# Patient Record
Sex: FEMALE | Race: WHITE | NOT HISPANIC OR LATINO | Employment: STUDENT | URBAN - METROPOLITAN AREA
[De-identification: names, ages, dates, MRNs, and addresses within clinical notes are randomized per-mention and may not be internally consistent; named-entity substitution may affect disease eponyms.]

---
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
## 2023-09-22 ENCOUNTER — HOSPITAL ENCOUNTER (EMERGENCY)
Facility: HOSPITAL | Age: 18
Discharge: HOME | End: 2023-09-23
Payer: COMMERCIAL

## 2023-09-22 DIAGNOSIS — S10.93XA CONTUSION OF NECK, INITIAL ENCOUNTER: ICD-10-CM

## 2023-09-22 DIAGNOSIS — S09.90XA CLOSED HEAD INJURY, INITIAL ENCOUNTER: Primary | ICD-10-CM

## 2023-09-22 PROCEDURE — 99284 EMERGENCY DEPT VISIT MOD MDM: CPT

## 2023-09-22 NOTE — Clinical Note
[facility] EMERGENCY DEPARTMENT  [address] [place] [address]  Phone: [phone]    Fanny Madison was seen and treated in our emergency department on 9/22/2023.  She may return to work on 09/25/2023.         Thank you for choosing [facility].    Jaylene Sylvester, APRN

## 2023-09-23 ENCOUNTER — APPOINTMENT (OUTPATIENT)
Dept: GENERAL RADIOLOGY | Facility: HOSPITAL | Age: 18
End: 2023-09-23
Payer: COMMERCIAL

## 2023-09-23 ENCOUNTER — APPOINTMENT (OUTPATIENT)
Dept: CT IMAGING | Facility: HOSPITAL | Age: 18
End: 2023-09-23
Payer: COMMERCIAL

## 2023-09-23 VITALS
HEIGHT: 61 IN | BODY MASS INDEX: 23.73 KG/M2 | SYSTOLIC BLOOD PRESSURE: 100 MMHG | HEART RATE: 98 BPM | TEMPERATURE: 97.5 F | RESPIRATION RATE: 18 BRPM | WEIGHT: 125.66 LBS | DIASTOLIC BLOOD PRESSURE: 60 MMHG | OXYGEN SATURATION: 100 %

## 2023-09-23 PROCEDURE — 70450 CT HEAD/BRAIN W/O DYE: CPT

## 2023-09-23 PROCEDURE — 71045 X-RAY EXAM CHEST 1 VIEW: CPT

## 2023-09-23 PROCEDURE — 72125 CT NECK SPINE W/O DYE: CPT

## 2023-09-23 NOTE — DISCHARGE INSTRUCTIONS
Tylenol Motrin as needed.  Follow-up with your family doctor.  No driving or operating heavy machinery while symptomatic until cleared by PCP or neurology.  May use ice 20 minutes at a time several times a day.  Return for any new or worsening symptoms

## 2023-09-23 NOTE — ED NOTES
C collar placed in triage, pt reports being dropped on head/neck at [employer] while crowd surfing and reports neck pain

## 2023-09-23 NOTE — ED PROVIDER NOTES
"Subjective   History of Present Illness  Chief complaint: Fall      Context: Patient is a 18-year-old female with mother with complaints of \" crowd surfing at the [employer] [place] when she was dropped and landed on the back of her head\" and is complaining of neck pain.  She states she was seen in the medic tent where she states she received IV fluids and was instructed to come to the hospital.  States she did have a brief episode of loss of consciousness without any bowel or bladder incontinence.  Was ambulatory after the incident denies any pain in her mid low back or lower extremities.  She denies any diplopia.  She denies any recreational drug use or alcohol use.  Also complaining of posterior neck and shoulder pain.  No distal weakness or numbness        PCP:         Review of Systems   Constitutional:  Negative for fever.     Past Medical History:   Diagnosis Date    Anxiety     Asthma     Deep vein thrombosis     Eating disorder     Low back pain        Allergies   Allergen Reactions    Aloe Other (See Comments)     Skin irritation and burning    Sunscreens Other (See Comments)     rash       No past surgical history on file.    Family History   Problem Relation Age of Onset    Depression Mother     Depression Father     Alcohol abuse Father     Cancer Maternal Aunt         breast       Social History     Socioeconomic History    Marital status: Single   Tobacco Use    Smoking status: Former    Smokeless tobacco: Never   Vaping Use    Vaping Use: Every day    Substances: Nicotine   Substance and Sexual Activity    Alcohol use: No    Drug use: No           Objective   Physical Exam    Vital signs and traige nurse note reviewed.  Constitutional:  Awake, alert; well developed and well nourished.  No acute distress, the patient is examined in hospital gown.  Patient is on her phone throughout most of the exam, parents at bedside.  HEENT:  Normocephalic, atraumatic; pupils are PERRL with intact EOM; " oropharynx is pink and moist without edema or erythema.  Neck: Supple, arrived in a c-collar  Cardiovascular: Regular rate and rhythm, normal S1-S2. .  Pulmonary: Respiratory effort regular, nonlabored; breath sounds CTA without wheezing or rhonchi.  Abdomen: Soft, nontender, nondistended with normoactive bowel sounds; no rebound or guarding.    Musculoskeletal: Independent range of motion of all extremities, no palpable tenderness or edema.   Back:  Spine is midline and without midline tenderness on palpation of cervical, thoracic, and lumbar spine; paraspinal musculature is nontender and without soft tissue swelling, overlying ecchymosis or erythema. ROM is without pain,  Distal muscle strength is 5/5.  Neuro: Alert oriented x3, speech is clear and appropriate. DTRs are symmetrical bilateral lower extremity, negative Babinski BLE, negative straight leg raise BLE, strong and equal dorsiflexion of bilateral great toes L4, L5, S1 motor sensory intact.      Procedures           ED Course  ED Course as of 09/23/23 0405   Sat Sep 23, 2023   0004 Care assumed   [JW]   0112 Nursing staff notified to ambulate patient and take off c-collar [JW]      ED Course User Index  [JW] Jaylene Sylvester, ELIEZER           Labs Reviewed - No data to display  Medications - No data to display  CT Head Without Contrast    Result Date: 9/23/2023  Impression: No acute intracranial process identified. Electronically Signed: Amisha Cronin MD  9/23/2023 12:58 AM EDT  Workstation ID: [id number]    CT Cervical Spine Without Contrast    Result Date: 9/23/2023  Impression: No acute osseous abnormality. Electronically Signed: Amisha Cronin MD  9/23/2023 1:00 AM EDT  Workstation ID: [id number]    XR Chest 1 View    Result Date: 9/23/2023  Impression: No acute cardiopulmonary process. Limited evaluation of the bilateral shoulders demonstrates no acute process. Electronically Signed: Amisha Cronin MD  9/23/2023 12:45 AM EDT  Workstation ID: [id number]   Prior to  "Admission medications    Medication Sig Start Date End Date Taking? Authorizing Provider   loratadine (CLARITIN) 10 MG tablet Take 1 tablet by mouth Daily. 5/1/19   Marleen Steiner PA   norelgestromin-ethinyl estradiol (Xulane) 150-35 MCG/24HR Place 1 patch on the skin as directed by provider 1 (One) Time Per Week. 9/29/22   Deandra Cruz APRN   spironolactone (ALDACTONE) 25 MG tablet Take 25 mg by mouth Daily. 1/5/22 1/5/23  Provider, MD Lashawn                                     Medical Decision Making      /73   Pulse 101   Temp 97.6 °F (36.4 °C) (Oral)   Resp 18   Ht 154.9 cm (61\")   Wt 57 kg (125 lb 10.6 oz)   SpO2 100%   BMI 23.74 kg/m²         Radiology interpretation:  X-rays reviewed independently and interpreted by me: Negative for fracture.  CT of the head and neck negative for ICH and fracture  Further interpretation by radiologist as above  Lab interpretation: Not felt to be emergently warranted          Appropriate PPE worn during exam.  Discussed physical findings with patient and parents.  CTs and x-rays were obtained to evaluate for fracture ICH also discussed potential concussion.  She was offered Tylenol and refused. After resulted c-collar was removed and patient was ambulated without any ataxia or syncope.            i discussed findings with patient and parents who voices understanding of discharge instructions, signs and symptoms requiring return to ED; discharged improved and in stable condition with follow up for re-evaluation.  This document is intended for medical expert use only. Reading of this document by patients and/or patient's family without participating medical staff guidance may result in misinterpretation and unintended morbidity.  Any interpretation of such data is the responsibility of the patient and/or family member responsible for the patient in concert with their primary or specialist providers, not to be left for sources of online searches such " as [employer], [employer] or similar queries. Relying on these approaches to knowledge may result in misinterpretation, misguided goals of care and even death should patients or family members try recommendations outside of the realm of professional medical care in a supervised inpatient environment.         Problems Addressed:  Closed head injury, initial encounter: complicated acute illness or injury  Contusion of neck, initial encounter: complicated acute illness or injury    Amount and/or Complexity of Data Reviewed  Radiology: ordered.        Final diagnoses:   Closed head injury, initial encounter   Contusion of neck, initial encounter       ED Disposition  ED Disposition       ED Disposition   Discharge    Condition   Stable    Comment   --               Deandra Cruz, APRN  [address]  [place] [address]  [phone]    Schedule an appointment as soon as possible for a visit            Medication List      No changes were made to your prescriptions during this visit.            Jaylene Sylvester, APRN  09/23/23 0406

## 2024-08-29 ENCOUNTER — HOSPITAL ENCOUNTER (EMERGENCY)
Facility: HOSPITAL | Age: 19
Discharge: HOME | End: 2024-08-29
Attending: EMERGENCY MEDICINE
Payer: MEDICAID

## 2024-08-29 ENCOUNTER — APPOINTMENT (OUTPATIENT)
Dept: ULTRASOUND IMAGING | Facility: HOSPITAL | Age: 19
End: 2024-08-29
Payer: MEDICAID

## 2024-08-29 VITALS
HEIGHT: 61 IN | SYSTOLIC BLOOD PRESSURE: 105 MMHG | DIASTOLIC BLOOD PRESSURE: 71 MMHG | OXYGEN SATURATION: 99 % | WEIGHT: 125.66 LBS | BODY MASS INDEX: 23.73 KG/M2 | HEART RATE: 103 BPM | TEMPERATURE: 97.5 F | RESPIRATION RATE: 11 BRPM

## 2024-08-29 DIAGNOSIS — O03.9 SPONTANEOUS ABORTION: Primary | ICD-10-CM

## 2024-08-29 LAB
ABO GROUP BLD: NORMAL
ANION GAP SERPL CALCULATED.3IONS-SCNC: 10.2 MMOL/L (ref 5–15)
BASOPHILS # BLD AUTO: 0.03 10*3/MM3 (ref 0–0.2)
BASOPHILS NFR BLD AUTO: 0.2 % (ref 0–1.5)
BUN SERPL-MCNC: 11 MG/DL (ref 6–20)
BUN/CREAT SERPL: 21.6 (ref 7–25)
CALCIUM SPEC-SCNC: 8.3 MG/DL (ref 8.6–10.5)
CHLORIDE SERPL-SCNC: 104 MMOL/L (ref 98–107)
CO2 SERPL-SCNC: 19.8 MMOL/L (ref 22–29)
CREAT SERPL-MCNC: 0.51 MG/DL (ref 0.57–1)
DEPRECATED RDW RBC AUTO: 39.9 FL (ref 37–54)
EGFRCR SERPLBLD CKD-EPI 2021: 136.4 ML/MIN/1.73
EOSINOPHIL # BLD AUTO: 0.02 10*3/MM3 (ref 0–0.4)
EOSINOPHIL NFR BLD AUTO: 0.1 % (ref 0.3–6.2)
ERYTHROCYTE [DISTWIDTH] IN BLOOD BY AUTOMATED COUNT: 11.7 % (ref 12.3–15.4)
GLUCOSE SERPL-MCNC: 93 MG/DL (ref 65–99)
HCT VFR BLD AUTO: 31.3 % (ref 34–46.6)
HGB BLD-MCNC: 11.1 G/DL (ref 12–15.9)
IMM GRANULOCYTES # BLD AUTO: 0.06 10*3/MM3 (ref 0–0.05)
IMM GRANULOCYTES NFR BLD AUTO: 0.4 % (ref 0–0.5)
LYMPHOCYTES # BLD AUTO: 1.54 10*3/MM3 (ref 0.7–3.1)
LYMPHOCYTES NFR BLD AUTO: 10.3 % (ref 19.6–45.3)
MCH RBC QN AUTO: 33.3 PG (ref 26.6–33)
MCHC RBC AUTO-ENTMCNC: 35.5 G/DL (ref 31.5–35.7)
MCV RBC AUTO: 94 FL (ref 79–97)
MONOCYTES # BLD AUTO: 0.96 10*3/MM3 (ref 0.1–0.9)
MONOCYTES NFR BLD AUTO: 6.4 % (ref 5–12)
NEUTROPHILS NFR BLD AUTO: 12.3 10*3/MM3 (ref 1.7–7)
NEUTROPHILS NFR BLD AUTO: 82.6 % (ref 42.7–76)
NRBC BLD AUTO-RTO: 0 /100 WBC (ref 0–0.2)
NUMBER OF DOSES: NORMAL
PLATELET # BLD AUTO: 251 10*3/MM3 (ref 140–450)
PMV BLD AUTO: 8.5 FL (ref 6–12)
POTASSIUM SERPL-SCNC: 4 MMOL/L (ref 3.5–5.2)
QT INTERVAL: 342 MS
QTC INTERVAL: 408 MS
RBC # BLD AUTO: 3.33 10*6/MM3 (ref 3.77–5.28)
RH BLD: POSITIVE
SODIUM SERPL-SCNC: 134 MMOL/L (ref 136–145)
WBC NRBC COR # BLD AUTO: 14.91 10*3/MM3 (ref 3.4–10.8)

## 2024-08-29 PROCEDURE — 86900 BLOOD TYPING SEROLOGIC ABO: CPT | Performed by: EMERGENCY MEDICINE

## 2024-08-29 PROCEDURE — 96374 THER/PROPH/DIAG INJ IV PUSH: CPT

## 2024-08-29 PROCEDURE — 99284 EMERGENCY DEPT VISIT MOD MDM: CPT

## 2024-08-29 PROCEDURE — 93005 ELECTROCARDIOGRAM TRACING: CPT | Performed by: EMERGENCY MEDICINE

## 2024-08-29 PROCEDURE — 93005 ELECTROCARDIOGRAM TRACING: CPT

## 2024-08-29 PROCEDURE — 25010000002 HYDROMORPHONE 1 MG/ML SOLUTION: Performed by: EMERGENCY MEDICINE

## 2024-08-29 PROCEDURE — 76801 OB US < 14 WKS SINGLE FETUS: CPT

## 2024-08-29 PROCEDURE — 25810000003 LACTATED RINGERS SOLUTION: Performed by: EMERGENCY MEDICINE

## 2024-08-29 PROCEDURE — 93976 VASCULAR STUDY: CPT

## 2024-08-29 PROCEDURE — 86901 BLOOD TYPING SEROLOGIC RH(D): CPT | Performed by: EMERGENCY MEDICINE

## 2024-08-29 PROCEDURE — 76817 TRANSVAGINAL US OBSTETRIC: CPT

## 2024-08-29 PROCEDURE — 85025 COMPLETE CBC W/AUTO DIFF WBC: CPT | Performed by: EMERGENCY MEDICINE

## 2024-08-29 PROCEDURE — 80048 BASIC METABOLIC PNL TOTAL CA: CPT | Performed by: EMERGENCY MEDICINE

## 2024-08-29 RX ORDER — METHYLERGONOVINE MALEATE 0.2 MG/1
0.2 TABLET ORAL EVERY 6 HOURS
Qty: 7 TABLET | Refills: 0 | Status: SHIPPED | OUTPATIENT
Start: 2024-08-29 | End: 2024-09-01

## 2024-08-29 RX ORDER — METHYLERGONOVINE MALEATE 0.2 MG/1
200 TABLET ORAL ONCE
Status: COMPLETED | OUTPATIENT
Start: 2024-08-29 | End: 2024-08-29

## 2024-08-29 RX ORDER — HYDROCODONE BITARTRATE AND ACETAMINOPHEN 5; 325 MG/1; MG/1
1 TABLET ORAL EVERY 6 HOURS PRN
Qty: 6 TABLET | Refills: 0 | Status: SHIPPED | OUTPATIENT
Start: 2024-08-29 | End: 2024-09-01

## 2024-08-29 RX ADMIN — METHYLERGONOVINE MALEATE 200 MCG: 0.2 TABLET ORAL at 03:29

## 2024-08-29 RX ADMIN — SODIUM CHLORIDE, POTASSIUM CHLORIDE, SODIUM LACTATE AND CALCIUM CHLORIDE 1000 ML: 600; 310; 30; 20 INJECTION, SOLUTION INTRAVENOUS at 01:02

## 2024-08-29 RX ADMIN — HYDROMORPHONE HYDROCHLORIDE 0.25 MG: 1 INJECTION, SOLUTION INTRAMUSCULAR; INTRAVENOUS; SUBCUTANEOUS at 03:14

## 2024-08-29 NOTE — DISCHARGE INSTRUCTIONS
Rest today.  Follow-up Dr. Thompson call office today with report on how you are doing, return increased bleeding or as needed

## 2024-08-29 NOTE — ED PROVIDER NOTES
Subjective   History of Present Illness  19-year-old female presents after having received misoprostol for fetal demise earlier today.  She had been followed by GYN.  She had IUP that showed no growth and no cardiac activity.  She had not spontaneously miscarried.  She is G2, P0 Ab1.  She states that she started having heavy bleeding tonight.  She reports she had passed out.  She has had no chest pain shortness of breath.  She has not complained of any significant abdominal pain at this time.  She states her bleeding was so heavy she was wearing adult diaper.  Review of Systems    Past Medical History:   Diagnosis Date    Anxiety     Asthma     Deep vein thrombosis     Eating disorder     Low back pain        Allergies   Allergen Reactions    Aloe Other (See Comments)     Skin irritation and burning    Sunscreens Other (See Comments)     rash       No past surgical history on file.    Family History   Problem Relation Age of Onset    Depression Mother     Depression Father     Alcohol abuse Father     Cancer Maternal Aunt         breast       Social History     Socioeconomic History    Marital status: Single   Tobacco Use    Smoking status: Former    Smokeless tobacco: Never   Vaping Use    Vaping status: Every Day    Substances: Nicotine   Substance and Sexual Activity    Alcohol use: No    Drug use: No     No routine medications      Objective   Physical Exam  19-year-old female awake alert.  Generally well-developed well-nourished.  Chest clear cardiovascular rhythm abdomen is soft without mass rebound or guarding pelvic exam was performed she has noted to have clot in vault was flooded initially.  This was removed.  There was clot at cervical os.  She did not have significant tenderness on bimanual exam.  Procedures           ED Course      Results for orders placed or performed during the hospital encounter of 08/29/24   Basic Metabolic Panel    Specimen: Arm, Left; Blood   Result Value Ref Range    Glucose 93  65 - 99 mg/dL    BUN 11 6 - 20 mg/dL    Creatinine 0.51 (L) 0.57 - 1.00 mg/dL    Sodium 134 (L) 136 - 145 mmol/L    Potassium 4.0 3.5 - 5.2 mmol/L    Chloride 104 98 - 107 mmol/L    CO2 19.8 (L) 22.0 - 29.0 mmol/L    Calcium 8.3 (L) 8.6 - 10.5 mg/dL    BUN/Creatinine Ratio 21.6 7.0 - 25.0    Anion Gap 10.2 5.0 - 15.0 mmol/L    eGFR 136.4 >60.0 mL/min/1.73   CBC Auto Differential    Specimen: Arm, Left; Blood   Result Value Ref Range    WBC 14.91 (H) 3.40 - 10.80 10*3/mm3    RBC 3.33 (L) 3.77 - 5.28 10*6/mm3    Hemoglobin 11.1 (L) 12.0 - 15.9 g/dL    Hematocrit 31.3 (L) 34.0 - 46.6 %    MCV 94.0 79.0 - 97.0 fL    MCH 33.3 (H) 26.6 - 33.0 pg    MCHC 35.5 31.5 - 35.7 g/dL    RDW 11.7 (L) 12.3 - 15.4 %    RDW-SD 39.9 37.0 - 54.0 fl    MPV 8.5 6.0 - 12.0 fL    Platelets 251 140 - 450 10*3/mm3    Neutrophil % 82.6 (H) 42.7 - 76.0 %    Lymphocyte % 10.3 (L) 19.6 - 45.3 %    Monocyte % 6.4 5.0 - 12.0 %    Eosinophil % 0.1 (L) 0.3 - 6.2 %    Basophil % 0.2 0.0 - 1.5 %    Immature Grans % 0.4 0.0 - 0.5 %    Neutrophils, Absolute 12.30 (H) 1.70 - 7.00 10*3/mm3    Lymphocytes, Absolute 1.54 0.70 - 3.10 10*3/mm3    Monocytes, Absolute 0.96 (H) 0.10 - 0.90 10*3/mm3    Eosinophils, Absolute 0.02 0.00 - 0.40 10*3/mm3    Basophils, Absolute 0.03 0.00 - 0.20 10*3/mm3    Immature Grans, Absolute 0.06 (H) 0.00 - 0.05 10*3/mm3    nRBC 0.0 0.0 - 0.2 /100 WBC   ECG 12 Lead Syncope   Result Value Ref Range    QT Interval 342 ms    QTC Interval 408 ms    RhIg Evaluation    Specimen: Arm, Left; Blood   Result Value Ref Range    ABO Type A     RH type Positive    Doses of Rh Immune Globulin    Specimen: Arm, Left; Blood   Result Value Ref Range    Number of Doses       RhIg is not indicated due to the patient's Rh status     US Ob Transvaginal    Result Date: 2024  Impression: Blood products and debris present within the endometrial cavity. No gestational sac. No suspicious adnexal mass. Electronically Signed: Amisha Cronin MD   "2024 2:44 AM EDT  Workstation ID: [id number]    US Ob < 14 Weeks Single or First Gestation    Result Date: 2024  Impression: Blood products and debris present within the endometrial cavity. No gestational sac. No suspicious adnexal mass. Electronically Signed: Amisha Cronin MD  2024 2:44 AM EDT  Workstation ID: [id number]   Medications   lactated ringers bolus 1,000 mL (0 mL Intravenous Stopped 24 0156)   HYDROmorphone (DILAUDID) injection 0.25 mg (0.25 mg Intravenous Given 24 0314)   methylergonovine (METHERGINE) tablet 200 mcg (200 mcg Oral Given 24)     /71 (BP Location: Right arm, Patient Position: Lying)   Pulse 103   Temp 97.5 °F (36.4 °C)   Resp 11   Ht 154.9 cm (61\")   Wt 57 kg (125 lb 10.6 oz)   SpO2 99%   BMI 23.74 kg/m²                                          Medical Decision Making  Problems Addressed:  Spontaneous : complicated acute illness or injury    Amount and/or Complexity of Data Reviewed  Labs: ordered.  Radiology: ordered.  ECG/medicine tests: ordered.    Risk  Prescription drug management.    Chart review: Patient had GYN appointment 2022 with breakthrough bleeding with IUD and malpositioned IUD.  Comorbidity: As per past history   Differential: Impending versus incomplete AB, acute blood loss anemia  My EKG interpretation: Sinus rhythm rate of 85  Lab: Rh+ basic metabolic panel sodium 135 BUN 11 creatinine 0.51 CO2 19 CBC white count 14.9 with hemoglobin 11.1 platelet count of 251 with 82 segs no bands  My Radiology review and interpretation: Ultrasound was obtained uterus measures 10.8 cm there is some complex debris and blood products within the endometrial cavity.  Ovaries appear normal with normal vascular flow.  Discussion/treatment: Repeat evaluation patient's bleeding has improved.  She was given a liter of fluids.  Patient was discussed with Dr. Spencer recommended Methergine.  She was given first dose of this.  She was also " given Dilaudid as she was having some crampy pain.  She was discharged with prescription for Methergine.  Hydrocodone.  She is to rest today follow-up with obstetrician call office today with report on how she is doing.  Patient was evaluated using appropriate PPE      Final diagnoses:   Spontaneous        ED Disposition  ED Disposition       ED Disposition   Discharge    Condition   Stable    Comment   --               Sena Spencer MD  [facility] [address] [place] [address] [phone]               Medication List        New Prescriptions      HYDROcodone-acetaminophen 5-325 MG per tablet  Commonly known as: Norco  Take 1 tablet by mouth Every 6 (Six) Hours As Needed for Moderate Pain.     methylergonovine 0.2 MG tablet  Commonly known as: Methergine  Take 1 tablet by mouth Every 6 (Six) Hours.               Where to Get Your Medications        These medications were sent to [facility] [id number] - [place], [place] - [address]  AT [facility] - [phone]  - [phone] FX  [address] [place] [address]      Phone: [phone]   HYDROcodone-acetaminophen 5-325 MG per tablet  methylergonovine 0.2 MG tablet            Bobo Sharpe MD  24 1234

## 2024-09-01 ENCOUNTER — APPOINTMENT (OUTPATIENT)
Dept: ULTRASOUND IMAGING | Facility: HOSPITAL | Age: 19
End: 2024-09-01
Payer: MEDICAID

## 2024-09-01 ENCOUNTER — ANESTHESIA (OUTPATIENT)
Dept: PERIOP | Facility: HOSPITAL | Age: 19
End: 2024-09-01
Payer: MEDICAID

## 2024-09-01 ENCOUNTER — ANESTHESIA EVENT (OUTPATIENT)
Dept: PERIOP | Facility: HOSPITAL | Age: 19
End: 2024-09-01
Payer: MEDICAID

## 2024-09-01 ENCOUNTER — HOSPITAL ENCOUNTER (OUTPATIENT)
Facility: HOSPITAL | Age: 19
Discharge: HOME | End: 2024-09-02
Attending: STUDENT IN AN ORGANIZED HEALTH CARE EDUCATION/TRAINING PROGRAM | Admitting: STUDENT IN AN ORGANIZED HEALTH CARE EDUCATION/TRAINING PROGRAM
Payer: MEDICAID

## 2024-09-01 DIAGNOSIS — N93.9 VAGINAL BLEEDING: Primary | ICD-10-CM

## 2024-09-01 DIAGNOSIS — O03.4 INCOMPLETE ABORTION: ICD-10-CM

## 2024-09-01 DIAGNOSIS — O03.9 SPONTANEOUS ABORTION: ICD-10-CM

## 2024-09-01 PROBLEM — D62 ANEMIA DUE TO ACUTE BLOOD LOSS: Status: ACTIVE | Noted: 2024-09-01

## 2024-09-01 LAB
ABO GROUP BLD: NORMAL
ALBUMIN SERPL-MCNC: 4.3 G/DL (ref 3.5–5.2)
ALBUMIN/GLOB SERPL: 1.7 G/DL
ALP SERPL-CCNC: 40 U/L (ref 39–117)
ALT SERPL W P-5'-P-CCNC: 6 U/L (ref 1–33)
ANION GAP SERPL CALCULATED.3IONS-SCNC: 11.3 MMOL/L (ref 5–15)
AST SERPL-CCNC: 21 U/L (ref 1–32)
BASOPHILS # BLD AUTO: 0.02 10*3/MM3 (ref 0–0.2)
BASOPHILS # BLD AUTO: 0.04 10*3/MM3 (ref 0–0.2)
BASOPHILS NFR BLD AUTO: 0.2 % (ref 0–1.5)
BASOPHILS NFR BLD AUTO: 0.3 % (ref 0–1.5)
BILIRUB SERPL-MCNC: 0.2 MG/DL (ref 0–1.2)
BLD GP AB SCN SERPL QL: NEGATIVE
BUN SERPL-MCNC: 10 MG/DL (ref 6–20)
BUN/CREAT SERPL: 17.2 (ref 7–25)
CALCIUM SPEC-SCNC: 9.5 MG/DL (ref 8.6–10.5)
CHLORIDE SERPL-SCNC: 106 MMOL/L (ref 98–107)
CO2 SERPL-SCNC: 23.7 MMOL/L (ref 22–29)
CREAT SERPL-MCNC: 0.58 MG/DL (ref 0.57–1)
DEPRECATED RDW RBC AUTO: 40.5 FL (ref 37–54)
DEPRECATED RDW RBC AUTO: 41.3 FL (ref 37–54)
EGFRCR SERPLBLD CKD-EPI 2021: 132.2 ML/MIN/1.73
EOSINOPHIL # BLD AUTO: 0.05 10*3/MM3 (ref 0–0.4)
EOSINOPHIL # BLD AUTO: 0.07 10*3/MM3 (ref 0–0.4)
EOSINOPHIL NFR BLD AUTO: 0.5 % (ref 0.3–6.2)
EOSINOPHIL NFR BLD AUTO: 0.6 % (ref 0.3–6.2)
ERYTHROCYTE [DISTWIDTH] IN BLOOD BY AUTOMATED COUNT: 11.7 % (ref 12.3–15.4)
ERYTHROCYTE [DISTWIDTH] IN BLOOD BY AUTOMATED COUNT: 11.9 % (ref 12.3–15.4)
GLOBULIN UR ELPH-MCNC: 2.5 GM/DL
GLUCOSE SERPL-MCNC: 111 MG/DL (ref 65–99)
HCG INTACT+B SERPL-ACNC: 1032 MIU/ML
HCT VFR BLD AUTO: 21.6 % (ref 34–46.6)
HCT VFR BLD AUTO: 25.9 % (ref 34–46.6)
HCT VFR BLD AUTO: 30.3 % (ref 34–46.6)
HGB BLD-MCNC: 10.4 G/DL (ref 12–15.9)
HGB BLD-MCNC: 7.7 G/DL (ref 12–15.9)
HGB BLD-MCNC: 8.8 G/DL (ref 12–15.9)
IMM GRANULOCYTES # BLD AUTO: 0.02 10*3/MM3 (ref 0–0.05)
IMM GRANULOCYTES # BLD AUTO: 0.07 10*3/MM3 (ref 0–0.05)
IMM GRANULOCYTES NFR BLD AUTO: 0.2 % (ref 0–0.5)
IMM GRANULOCYTES NFR BLD AUTO: 0.5 % (ref 0–0.5)
LYMPHOCYTES # BLD AUTO: 1.99 10*3/MM3 (ref 0.7–3.1)
LYMPHOCYTES # BLD AUTO: 2.6 10*3/MM3 (ref 0.7–3.1)
LYMPHOCYTES NFR BLD AUTO: 18 % (ref 19.6–45.3)
LYMPHOCYTES NFR BLD AUTO: 23.1 % (ref 19.6–45.3)
MCH RBC QN AUTO: 32.8 PG (ref 26.6–33)
MCH RBC QN AUTO: 34.4 PG (ref 26.6–33)
MCHC RBC AUTO-ENTMCNC: 34.3 G/DL (ref 31.5–35.7)
MCHC RBC AUTO-ENTMCNC: 35.6 G/DL (ref 31.5–35.7)
MCV RBC AUTO: 95.6 FL (ref 79–97)
MCV RBC AUTO: 96.4 FL (ref 79–97)
MONOCYTES # BLD AUTO: 0.61 10*3/MM3 (ref 0.1–0.9)
MONOCYTES # BLD AUTO: 0.87 10*3/MM3 (ref 0.1–0.9)
MONOCYTES NFR BLD AUTO: 6 % (ref 5–12)
MONOCYTES NFR BLD AUTO: 7.1 % (ref 5–12)
NEUTROPHILS NFR BLD AUTO: 10.79 10*3/MM3 (ref 1.7–7)
NEUTROPHILS NFR BLD AUTO: 5.93 10*3/MM3 (ref 1.7–7)
NEUTROPHILS NFR BLD AUTO: 68.8 % (ref 42.7–76)
NEUTROPHILS NFR BLD AUTO: 74.7 % (ref 42.7–76)
NRBC BLD AUTO-RTO: 0 /100 WBC (ref 0–0.2)
NRBC BLD AUTO-RTO: 0 /100 WBC (ref 0–0.2)
PLATELET # BLD AUTO: 169 10*3/MM3 (ref 140–450)
PLATELET # BLD AUTO: 279 10*3/MM3 (ref 140–450)
PMV BLD AUTO: 8.7 FL (ref 6–12)
PMV BLD AUTO: 8.7 FL (ref 6–12)
POTASSIUM SERPL-SCNC: 3.7 MMOL/L (ref 3.5–5.2)
PROT SERPL-MCNC: 6.8 G/DL (ref 6–8.5)
RBC # BLD AUTO: 2.24 10*6/MM3 (ref 3.77–5.28)
RBC # BLD AUTO: 3.17 10*6/MM3 (ref 3.77–5.28)
RH BLD: POSITIVE
SODIUM SERPL-SCNC: 141 MMOL/L (ref 136–145)
T&S EXPIRATION DATE: NORMAL
WBC NRBC COR # BLD AUTO: 14.44 10*3/MM3 (ref 3.4–10.8)
WBC NRBC COR # BLD AUTO: 8.62 10*3/MM3 (ref 3.4–10.8)
WHOLE BLOOD HOLD COAG: NORMAL

## 2024-09-01 PROCEDURE — 25010000002 MIDAZOLAM PER 1 MG

## 2024-09-01 PROCEDURE — 25810000003 LACTATED RINGERS PER 1000 ML

## 2024-09-01 PROCEDURE — 76817 TRANSVAGINAL US OBSTETRIC: CPT

## 2024-09-01 PROCEDURE — 25010000002 HYDROMORPHONE 1 MG/ML SOLUTION

## 2024-09-01 PROCEDURE — 86901 BLOOD TYPING SEROLOGIC RH(D): CPT | Performed by: NURSE PRACTITIONER

## 2024-09-01 PROCEDURE — 25810000003 LACTATED RINGERS PER 1000 ML: Performed by: STUDENT IN AN ORGANIZED HEALTH CARE EDUCATION/TRAINING PROGRAM

## 2024-09-01 PROCEDURE — 99285 EMERGENCY DEPT VISIT HI MDM: CPT

## 2024-09-01 PROCEDURE — 80053 COMPREHEN METABOLIC PANEL: CPT | Performed by: NURSE PRACTITIONER

## 2024-09-01 PROCEDURE — 25010000002 FENTANYL CITRATE (PF) 100 MCG/2ML SOLUTION

## 2024-09-01 PROCEDURE — 84702 CHORIONIC GONADOTROPIN TEST: CPT | Performed by: NURSE PRACTITIONER

## 2024-09-01 PROCEDURE — 86850 RBC ANTIBODY SCREEN: CPT | Performed by: NURSE PRACTITIONER

## 2024-09-01 PROCEDURE — 82330 ASSAY OF CALCIUM: CPT

## 2024-09-01 PROCEDURE — 82803 BLOOD GASES ANY COMBINATION: CPT

## 2024-09-01 PROCEDURE — 88305 TISSUE EXAM BY PATHOLOGIST: CPT | Performed by: STUDENT IN AN ORGANIZED HEALTH CARE EDUCATION/TRAINING PROGRAM

## 2024-09-01 PROCEDURE — 25010000002 SUCCINYLCHOLINE PER 20 MG

## 2024-09-01 PROCEDURE — 25010000002 SUGAMMADEX 200 MG/2ML SOLUTION

## 2024-09-01 PROCEDURE — 85025 COMPLETE CBC W/AUTO DIFF WBC: CPT | Performed by: NURSE PRACTITIONER

## 2024-09-01 PROCEDURE — 25010000002 KETOROLAC TROMETHAMINE PER 15 MG: Performed by: STUDENT IN AN ORGANIZED HEALTH CARE EDUCATION/TRAINING PROGRAM

## 2024-09-01 PROCEDURE — 25010000002 ALBUMIN HUMAN 5% PER 50 ML

## 2024-09-01 PROCEDURE — 84295 ASSAY OF SERUM SODIUM: CPT

## 2024-09-01 PROCEDURE — 25010000002 DEXAMETHASONE PER 1 MG

## 2024-09-01 PROCEDURE — 93976 VASCULAR STUDY: CPT

## 2024-09-01 PROCEDURE — G0378 HOSPITAL OBSERVATION PER HR: HCPCS

## 2024-09-01 PROCEDURE — 85018 HEMOGLOBIN: CPT

## 2024-09-01 PROCEDURE — 25010000002 PROPOFOL 500 MG/50ML EMULSION

## 2024-09-01 PROCEDURE — 82947 ASSAY GLUCOSE BLOOD QUANT: CPT

## 2024-09-01 PROCEDURE — 85025 COMPLETE CBC W/AUTO DIFF WBC: CPT | Performed by: STUDENT IN AN ORGANIZED HEALTH CARE EDUCATION/TRAINING PROGRAM

## 2024-09-01 PROCEDURE — 76801 OB US < 14 WKS SINGLE FETUS: CPT

## 2024-09-01 PROCEDURE — 25810000003 SODIUM CHLORIDE 0.9 % SOLUTION: Performed by: NURSE PRACTITIONER

## 2024-09-01 PROCEDURE — 25010000002 PHENYLEPHRINE 10 MG/ML SOLUTION

## 2024-09-01 PROCEDURE — 86900 BLOOD TYPING SEROLOGIC ABO: CPT | Performed by: NURSE PRACTITIONER

## 2024-09-01 PROCEDURE — 85014 HEMATOCRIT: CPT

## 2024-09-01 PROCEDURE — 25010000002 METHYLERGONOVINE MALEATE PER 0.2 MG

## 2024-09-01 PROCEDURE — 25010000002 ONDANSETRON PER 1 MG

## 2024-09-01 PROCEDURE — P9041 ALBUMIN (HUMAN),5%, 50ML: HCPCS

## 2024-09-01 PROCEDURE — 84132 ASSAY OF SERUM POTASSIUM: CPT

## 2024-09-01 RX ORDER — HYDROCODONE BITARTRATE AND ACETAMINOPHEN 5; 325 MG/1; MG/1
1 TABLET ORAL EVERY 6 HOURS PRN
Status: DISCONTINUED | OUTPATIENT
Start: 2024-09-01 | End: 2024-09-02 | Stop reason: HOSPADM

## 2024-09-01 RX ORDER — DEXAMETHASONE SODIUM PHOSPHATE 4 MG/ML
INJECTION, SOLUTION INTRA-ARTICULAR; INTRALESIONAL; INTRAMUSCULAR; INTRAVENOUS; SOFT TISSUE AS NEEDED
Status: DISCONTINUED | OUTPATIENT
Start: 2024-09-01 | End: 2024-09-01 | Stop reason: SURG

## 2024-09-01 RX ORDER — DIPHENHYDRAMINE HYDROCHLORIDE 50 MG/ML
12.5 INJECTION INTRAMUSCULAR; INTRAVENOUS
Status: DISCONTINUED | OUTPATIENT
Start: 2024-09-01 | End: 2024-09-01

## 2024-09-01 RX ORDER — CETIRIZINE HYDROCHLORIDE 10 MG/1
10 TABLET ORAL DAILY
Status: DISCONTINUED | OUTPATIENT
Start: 2024-09-01 | End: 2024-09-02 | Stop reason: HOSPADM

## 2024-09-01 RX ORDER — METHYLERGONOVINE MALEATE 0.2 MG/1
200 TABLET ORAL ONCE
Status: DISCONTINUED | OUTPATIENT
Start: 2024-09-01 | End: 2024-09-01

## 2024-09-01 RX ORDER — METHYLERGONOVINE MALEATE 0.2 MG/ML
INJECTION INTRAVENOUS AS NEEDED
Status: DISCONTINUED | OUTPATIENT
Start: 2024-09-01 | End: 2024-09-01 | Stop reason: SURG

## 2024-09-01 RX ORDER — ONDANSETRON 4 MG/1
4 TABLET, ORALLY DISINTEGRATING ORAL EVERY 6 HOURS PRN
Status: DISCONTINUED | OUTPATIENT
Start: 2024-09-01 | End: 2024-09-02 | Stop reason: HOSPADM

## 2024-09-01 RX ORDER — OXYCODONE HYDROCHLORIDE 5 MG/1
10 TABLET ORAL EVERY 4 HOURS PRN
Status: DISCONTINUED | OUTPATIENT
Start: 2024-09-01 | End: 2024-09-01

## 2024-09-01 RX ORDER — MIDAZOLAM HYDROCHLORIDE 1 MG/ML
INJECTION INTRAMUSCULAR; INTRAVENOUS AS NEEDED
Status: DISCONTINUED | OUTPATIENT
Start: 2024-09-01 | End: 2024-09-01 | Stop reason: SURG

## 2024-09-01 RX ORDER — DIPHENHYDRAMINE HYDROCHLORIDE 50 MG/ML
12.5 INJECTION INTRAMUSCULAR; INTRAVENOUS ONCE AS NEEDED
Status: DISCONTINUED | OUTPATIENT
Start: 2024-09-01 | End: 2024-09-01

## 2024-09-01 RX ORDER — OXYCODONE HYDROCHLORIDE 5 MG/1
5 TABLET ORAL ONCE AS NEEDED
Status: DISCONTINUED | OUTPATIENT
Start: 2024-09-01 | End: 2024-09-01

## 2024-09-01 RX ORDER — TRANEXAMIC ACID 100 MG/ML
INJECTION, SOLUTION INTRAVENOUS AS NEEDED
Status: DISCONTINUED | OUTPATIENT
Start: 2024-09-01 | End: 2024-09-01 | Stop reason: SURG

## 2024-09-01 RX ORDER — EPHEDRINE SULFATE 5 MG/ML
5 INJECTION INTRAVENOUS ONCE AS NEEDED
Status: DISCONTINUED | OUTPATIENT
Start: 2024-09-01 | End: 2024-09-01

## 2024-09-01 RX ORDER — PROPOFOL 10 MG/ML
INJECTION, EMULSION INTRAVENOUS AS NEEDED
Status: DISCONTINUED | OUTPATIENT
Start: 2024-09-01 | End: 2024-09-01 | Stop reason: SURG

## 2024-09-01 RX ORDER — ONDANSETRON 2 MG/ML
4 INJECTION INTRAMUSCULAR; INTRAVENOUS ONCE AS NEEDED
Status: DISCONTINUED | OUTPATIENT
Start: 2024-09-01 | End: 2024-09-01

## 2024-09-01 RX ORDER — IBUPROFEN 400 MG/1
600 TABLET, FILM COATED ORAL EVERY 6 HOURS PRN
Status: DISCONTINUED | OUTPATIENT
Start: 2024-09-02 | End: 2024-09-02 | Stop reason: HOSPADM

## 2024-09-01 RX ORDER — SODIUM CHLORIDE, SODIUM LACTATE, POTASSIUM CHLORIDE, CALCIUM CHLORIDE 600; 310; 30; 20 MG/100ML; MG/100ML; MG/100ML; MG/100ML
125 INJECTION, SOLUTION INTRAVENOUS CONTINUOUS
Status: DISCONTINUED | OUTPATIENT
Start: 2024-09-01 | End: 2024-09-02 | Stop reason: HOSPADM

## 2024-09-01 RX ORDER — NALOXONE HCL 0.4 MG/ML
0.4 VIAL (ML) INJECTION AS NEEDED
Status: DISCONTINUED | OUTPATIENT
Start: 2024-09-01 | End: 2024-09-01

## 2024-09-01 RX ORDER — ACETAMINOPHEN 325 MG/1
650 TABLET ORAL EVERY 4 HOURS PRN
Status: DISCONTINUED | OUTPATIENT
Start: 2024-09-01 | End: 2024-09-02 | Stop reason: HOSPADM

## 2024-09-01 RX ORDER — METHYLERGONOVINE MALEATE 0.2 MG/1
200 TABLET ORAL EVERY 6 HOURS
Status: DISCONTINUED | OUTPATIENT
Start: 2024-09-01 | End: 2024-09-01

## 2024-09-01 RX ORDER — PHENYLEPHRINE HYDROCHLORIDE 10 MG/ML
INJECTION INTRAVENOUS AS NEEDED
Status: DISCONTINUED | OUTPATIENT
Start: 2024-09-01 | End: 2024-09-01 | Stop reason: SURG

## 2024-09-01 RX ORDER — DEXMEDETOMIDINE HYDROCHLORIDE 100 UG/ML
INJECTION, SOLUTION INTRAVENOUS AS NEEDED
Status: DISCONTINUED | OUTPATIENT
Start: 2024-09-01 | End: 2024-09-01 | Stop reason: SURG

## 2024-09-01 RX ORDER — SODIUM CHLORIDE 0.9 % (FLUSH) 0.9 %
10 SYRINGE (ML) INJECTION AS NEEDED
Status: DISCONTINUED | OUTPATIENT
Start: 2024-09-01 | End: 2024-09-01

## 2024-09-01 RX ORDER — SODIUM CHLORIDE, SODIUM LACTATE, POTASSIUM CHLORIDE, CALCIUM CHLORIDE 600; 310; 30; 20 MG/100ML; MG/100ML; MG/100ML; MG/100ML
INJECTION, SOLUTION INTRAVENOUS CONTINUOUS PRN
Status: DISCONTINUED | OUTPATIENT
Start: 2024-09-01 | End: 2024-09-01 | Stop reason: SURG

## 2024-09-01 RX ORDER — KETOROLAC TROMETHAMINE 30 MG/ML
30 INJECTION, SOLUTION INTRAMUSCULAR; INTRAVENOUS EVERY 6 HOURS PRN
Status: DISPENSED | OUTPATIENT
Start: 2024-09-01 | End: 2024-09-02

## 2024-09-01 RX ORDER — ALBUMIN, HUMAN INJ 5% 5 %
SOLUTION INTRAVENOUS CONTINUOUS PRN
Status: DISCONTINUED | OUTPATIENT
Start: 2024-09-01 | End: 2024-09-01 | Stop reason: SURG

## 2024-09-01 RX ORDER — DEXMEDETOMIDINE HYDROCHLORIDE 100 UG/ML
INJECTION, SOLUTION INTRAVENOUS AS NEEDED
Status: DISCONTINUED | OUTPATIENT
Start: 2024-09-01 | End: 2024-09-01

## 2024-09-01 RX ORDER — SUCCINYLCHOLINE CHLORIDE 20 MG/ML
INJECTION INTRAMUSCULAR; INTRAVENOUS AS NEEDED
Status: DISCONTINUED | OUTPATIENT
Start: 2024-09-01 | End: 2024-09-01 | Stop reason: SURG

## 2024-09-01 RX ORDER — METHYLERGONOVINE MALEATE 0.2 MG/1
200 TABLET ORAL EVERY 6 HOURS SCHEDULED
Status: DISCONTINUED | OUTPATIENT
Start: 2024-09-01 | End: 2024-09-02 | Stop reason: HOSPADM

## 2024-09-01 RX ORDER — ONDANSETRON 2 MG/ML
INJECTION INTRAMUSCULAR; INTRAVENOUS AS NEEDED
Status: DISCONTINUED | OUTPATIENT
Start: 2024-09-01 | End: 2024-09-01 | Stop reason: SURG

## 2024-09-01 RX ORDER — LABETALOL HYDROCHLORIDE 5 MG/ML
5 INJECTION, SOLUTION INTRAVENOUS
Status: DISCONTINUED | OUTPATIENT
Start: 2024-09-01 | End: 2024-09-01

## 2024-09-01 RX ORDER — FENTANYL CITRATE 50 UG/ML
INJECTION, SOLUTION INTRAMUSCULAR; INTRAVENOUS AS NEEDED
Status: DISCONTINUED | OUTPATIENT
Start: 2024-09-01 | End: 2024-09-01 | Stop reason: SURG

## 2024-09-01 RX ORDER — CALCIUM CARBONATE 500 MG/1
2 TABLET, CHEWABLE ORAL 2 TIMES DAILY PRN
Status: DISCONTINUED | OUTPATIENT
Start: 2024-09-01 | End: 2024-09-02 | Stop reason: HOSPADM

## 2024-09-01 RX ORDER — ROCURONIUM BROMIDE 10 MG/ML
INJECTION, SOLUTION INTRAVENOUS AS NEEDED
Status: DISCONTINUED | OUTPATIENT
Start: 2024-09-01 | End: 2024-09-01 | Stop reason: SURG

## 2024-09-01 RX ORDER — SCOLOPAMINE TRANSDERMAL SYSTEM 1 MG/1
PATCH, EXTENDED RELEASE TRANSDERMAL AS NEEDED
Status: DISCONTINUED | OUTPATIENT
Start: 2024-09-01 | End: 2024-09-01 | Stop reason: SURG

## 2024-09-01 RX ORDER — MISOPROSTOL 200 UG/1
800 TABLET ORAL ONCE
Status: DISCONTINUED | OUTPATIENT
Start: 2024-09-01 | End: 2024-09-01

## 2024-09-01 RX ORDER — MISOPROSTOL 200 UG/1
800 TABLET ORAL ONCE
Status: DISCONTINUED | OUTPATIENT
Start: 2024-09-01 | End: 2024-09-02 | Stop reason: HOSPADM

## 2024-09-01 RX ORDER — HYDRALAZINE HYDROCHLORIDE 20 MG/ML
5 INJECTION INTRAMUSCULAR; INTRAVENOUS
Status: DISCONTINUED | OUTPATIENT
Start: 2024-09-01 | End: 2024-09-01

## 2024-09-01 RX ORDER — DOXYCYCLINE 100 MG/1
200 CAPSULE ORAL ONCE
Status: COMPLETED | OUTPATIENT
Start: 2024-09-01 | End: 2024-09-01

## 2024-09-01 RX ADMIN — ALBUMIN (HUMAN): 12.5 INJECTION, SOLUTION INTRAVENOUS at 14:35

## 2024-09-01 RX ADMIN — SODIUM CHLORIDE 1000 ML: 9 INJECTION, SOLUTION INTRAVENOUS at 02:19

## 2024-09-01 RX ADMIN — MIDAZOLAM 2 MG: 1 INJECTION INTRAMUSCULAR; INTRAVENOUS at 13:23

## 2024-09-01 RX ADMIN — ONDANSETRON 4 MG: 2 INJECTION INTRAMUSCULAR; INTRAVENOUS at 13:55

## 2024-09-01 RX ADMIN — METHYLERGONOVINE MALEATE 200 MCG: 0.2 TABLET ORAL at 12:21

## 2024-09-01 RX ADMIN — PROPOFOL INJECTABLE EMULSION 200 MG: 10 INJECTION, EMULSION INTRAVENOUS at 13:30

## 2024-09-01 RX ADMIN — ROCURONIUM BROMIDE 35 MG: 10 INJECTION, SOLUTION INTRAVENOUS at 13:38

## 2024-09-01 RX ADMIN — SCOPALAMINE 1 PATCH: 1 PATCH, EXTENDED RELEASE TRANSDERMAL at 13:59

## 2024-09-01 RX ADMIN — SODIUM CHLORIDE, SODIUM LACTATE, POTASSIUM CHLORIDE, AND CALCIUM CHLORIDE: .6; .31; .03; .02 INJECTION, SOLUTION INTRAVENOUS at 13:23

## 2024-09-01 RX ADMIN — DEXAMETHASONE SODIUM PHOSPHATE 8 MG: 4 INJECTION, SOLUTION INTRAMUSCULAR; INTRAVENOUS at 13:45

## 2024-09-01 RX ADMIN — SODIUM CHLORIDE 1000 ML: 9 INJECTION, SOLUTION INTRAVENOUS at 00:55

## 2024-09-01 RX ADMIN — HYDROMORPHONE HYDROCHLORIDE 0.5 MG: 1 INJECTION, SOLUTION INTRAMUSCULAR; INTRAVENOUS; SUBCUTANEOUS at 14:42

## 2024-09-01 RX ADMIN — ROCURONIUM BROMIDE 5 MG: 10 INJECTION, SOLUTION INTRAVENOUS at 13:30

## 2024-09-01 RX ADMIN — FENTANYL CITRATE 50 MCG: 50 INJECTION, SOLUTION INTRAMUSCULAR; INTRAVENOUS at 13:37

## 2024-09-01 RX ADMIN — TRANEXAMIC ACID 1000 MG: 100 INJECTION, SOLUTION INTRAVENOUS at 14:15

## 2024-09-01 RX ADMIN — CETIRIZINE HYDROCHLORIDE 10 MG: 10 TABLET, FILM COATED ORAL at 12:20

## 2024-09-01 RX ADMIN — PHENYLEPHRINE HYDROCHLORIDE 100 MCG: 10 INJECTION INTRAVENOUS at 14:09

## 2024-09-01 RX ADMIN — PHENYLEPHRINE HYDROCHLORIDE 100 MCG: 10 INJECTION INTRAVENOUS at 14:04

## 2024-09-01 RX ADMIN — SODIUM CHLORIDE, POTASSIUM CHLORIDE, SODIUM LACTATE AND CALCIUM CHLORIDE 125 ML/HR: 600; 310; 30; 20 INJECTION, SOLUTION INTRAVENOUS at 17:11

## 2024-09-01 RX ADMIN — SUGAMMADEX 200 MG: 100 INJECTION, SOLUTION INTRAVENOUS at 14:40

## 2024-09-01 RX ADMIN — DEXMEDETOMIDINE HYDROCHLORIDE 4 MCG: 100 INJECTION, SOLUTION, CONCENTRATE INTRAVENOUS at 14:43

## 2024-09-01 RX ADMIN — DEXMEDETOMIDINE HYDROCHLORIDE 8 MCG: 100 INJECTION, SOLUTION, CONCENTRATE INTRAVENOUS at 13:59

## 2024-09-01 RX ADMIN — PHENYLEPHRINE HYDROCHLORIDE 100 MCG: 10 INJECTION INTRAVENOUS at 14:19

## 2024-09-01 RX ADMIN — KETOROLAC TROMETHAMINE 30 MG: 30 INJECTION, SOLUTION INTRAMUSCULAR at 23:37

## 2024-09-01 RX ADMIN — DEXMEDETOMIDINE HYDROCHLORIDE 4 MCG: 100 INJECTION, SOLUTION, CONCENTRATE INTRAVENOUS at 14:45

## 2024-09-01 RX ADMIN — DOXYCYCLINE 200 MG: 100 CAPSULE ORAL at 13:23

## 2024-09-01 RX ADMIN — SUCCINYLCHOLINE CHLORIDE 100 MG: 20 INJECTION, SOLUTION INTRAMUSCULAR; INTRAVENOUS at 13:31

## 2024-09-01 RX ADMIN — METHYLERGONOVINE MALEATE 200 MCG: 0.2 INJECTION, SOLUTION INTRAMUSCULAR; INTRAVENOUS at 14:05

## 2024-09-01 RX ADMIN — DEXMEDETOMIDINE HYDROCHLORIDE 4 MCG: 100 INJECTION, SOLUTION, CONCENTRATE INTRAVENOUS at 14:52

## 2024-09-01 RX ADMIN — FENTANYL CITRATE 50 MCG: 50 INJECTION, SOLUTION INTRAMUSCULAR; INTRAVENOUS at 13:30

## 2024-09-01 RX ADMIN — HYDROMORPHONE HYDROCHLORIDE 0.5 MG: 1 INJECTION, SOLUTION INTRAMUSCULAR; INTRAVENOUS; SUBCUTANEOUS at 14:45

## 2024-09-01 NOTE — ED NOTES
Pt states she does not want medications. Pt states she just wasn't to stay and talk to OB in the morning to discuss a D&C.

## 2024-09-01 NOTE — BRIEF OP NOTE
DILATATION AND CURETTAGE WITH SUCTION  Progress Note    Fanny Madison  2024    Pre-op Diagnosis:   incomplete miscarriage       Post-Op Diagnosis Codes:     * Incomplete  [O03.4]    Procedure(s):  DILATATION AND CURETTAGE WITH SUCTION and ultrasound guidance        Surgeon(s):  Azalia Ricks MD    Anesthesia: General    Staff:   Circulator: Randall De La Cruz RN; Kari Patrick RN  Scrub Person: Sewell Lamar L         Estimated Blood Loss: 250 mL    Urine Voided: * No values recorded between 2024  1:22 PM and 2024  2:50 PM *    Specimens:                Specimens       ID Source Type Tests Collected By Collected At Frozen?    A Products of Conception Tissue TISSUE PATHOLOGY EXAM   Azalia Ricks MD 24 1404     Description: PRODUCTS OF CONCEPTION    This specimen was not marked as sent.                  Drains: * No LDAs found *    Findings:   Retained products of conception      Description of Procedure:   The risks, benefits, and alternatives of the procedure were discussed with the patient. She understood the risks of the procedure include but are not limited to uterine perforation, fluid overload, and rare risk of death. She wished to proceed and signed the consent.    The patient was taken to the OR where general anesthesia was administered. She was placed in the dorsal lithotomy position with Bob stirrups. The patient was then prepared and draped in the normal sterile fashion.    A weighted speculum was inserted in the vagina. A ring forceps was used to grasp the anterior lip of the cervix.      The cervical os was sequentially dilated under ultrasoudn guidance to accommodate the 9 mm Fr suction curette using Regla dilators.     The products of conception were removed with a suction curette and then was curetted in a clockwise fashion with the banjo curette until the products of conception were removed from all aspects of uterus. The products of conception were sent to  Pathology. Continued bleeding was noted from the uterus so Methergine was given as well as fundal massage was performed. She continued to have bleeding, so bladder was emptied, TXA was given, and a 30 cc intrauterine Jaramillo was inflated and held in place for 5 minutes. After its' removal, bleeding had slowed, but was still oozing, so rectal cytotec was given and intrauterine Jaramillo was placed for an additional 5 minutes while fundal massage was performed. After its' removal, no further uterine bleeding was noted and hemostasis was obtained. The ring forceps were removed from the cervix and good hemostasis was noted.    The patient tolerated the procedure well. The instrument and sponge counts were correct times two. The patient was awakened from general anesthesia and taken to the recovery room in a stable condition.             Complications: None          Azalia Ricks MD     Date: 9/1/2024  Time: 14:52 EDT

## 2024-09-01 NOTE — ED NOTES
Attempted report for 206 again and was told nurse was collecting blood and would call back, charge nurse made aware.

## 2024-09-01 NOTE — ED NOTES
Attempted to give report to nurse for 206, was told nurse was completing an admission and would call back.

## 2024-09-01 NOTE — ANESTHESIA PREPROCEDURE EVALUATION
Anesthesia Evaluation     no history of anesthetic complications:   NPO Solid Status: > 8 hours  NPO Liquid Status: > 4 hours           Airway   Mallampati: I  TM distance: >3 FB  Neck ROM: full  No difficulty expected  Dental - normal exam     Pulmonary - normal exam   (+) asthma (exercise induced; no inhaler use),  Cardiovascular - negative cardio ROS and normal exam        Neuro/Psych  (+) psychiatric history Anxiety and Depression  GI/Hepatic/Renal/Endo - negative ROS     Musculoskeletal (-) negative ROS    Abdominal  - normal exam   Substance History - negative use     OB/GYN    (+) Pregnant    Comment: < 12 weeks.  Misoprostol for no cardiac activity       Other        ROS/Med Hx Other: Anemia H/H 7.7 9/1/24              Anesthesia Plan    ASA 2     general     intravenous induction     Anesthetic plan, risks, benefits, and alternatives have been provided, discussed and informed consent has been obtained with: patient.  Pre-procedure education provided  Use of blood products discussed with patient  Consented to blood products.    Plan discussed with attending.    CODE STATUS:    Code Status (Patient has no pulse and is not breathing): CPR (Attempt to Resuscitate)  Medical Interventions (Patient has pulse or is breathing): Full Support

## 2024-09-01 NOTE — ANESTHESIA POSTPROCEDURE EVALUATION
Patient: Fanny Madison    Procedure Summary       Date: 24 Room / Location: [facility] OR   [facility] MAIN OR    Anesthesia Start: 1323 Anesthesia Stop: 151    Procedure: DILATATION AND CURETTAGE WITH SUCTION and ultrasound guidance (Vagina) Diagnosis:       Incomplete       (incomplete miscarriage)    Surgeons: Azalia Ricks MD Provider: Kari Luo CRNA    Anesthesia Type: general ASA Status: 2            Anesthesia Type: general    Vitals  Vitals Value Taken Time   /49 24 1517   Temp 97.9 °F (36.6 °C) 24 1455   Pulse 92 24 1519   Resp 10 24 1515   SpO2 100 % 24 1519   Vitals shown include unfiled device data.        Post Anesthesia Care and Evaluation    Patient location during evaluation: bedside  Patient participation: complete - patient participated  Level of consciousness: awake and alert  Pain score: 0  Pain management: adequate    Airway patency: patent  Anesthetic complications: No anesthetic complications  PONV Status: none  Cardiovascular status: acceptable  Respiratory status: acceptable  Hydration status: acceptable    
Discharged

## 2024-09-01 NOTE — ANESTHESIA PROCEDURE NOTES
Airway  Date/Time: 9/1/2024 1:33 PM    General Information and Staff    Patient location during procedure: OR  CRNA/CAA: Kari Luo CRNA    Indications and Patient Condition  Indications for airway management: airway protection    Preoxygenated: yes  MILS maintained throughout  Mask difficulty assessment: 1 - vent by mask    Final Airway Details  Final airway type: endotracheal airway      Successful airway: ETT  Cuffed: yes   Successful intubation technique: direct laryngoscopy  Endotracheal tube insertion site: oral  Blade: Syd  Blade size: 3  ETT size (mm): 7.0  Cormack-Lehane Classification: grade I - full view of glottis  Placement verified by: chest auscultation   Cuff volume (mL): 7  Measured from: gums  ETT/EBT to gums (cm): 21  Number of attempts at approach: 1  Assessment: lips, teeth, and gum same as pre-op and atraumatic intubation

## 2024-09-01 NOTE — NURSING NOTE
Stat H&H drawn and sent to lab per anesthesia and MD orders;  Spoke with Dr Ricks and updated on patients condition; Pt has small amount of bleeding noted to josé pad. Pt denies pain or discomfort at this time other than her throat and lip being sore from intubation. Anesthesia at bedside to assure patient that her throat and lip discomfort will resolve in time. Pt gave verbal understanding.Pt pleasant in PACU. Awake and tolerating PO without difficulty.

## 2024-09-01 NOTE — ED PROVIDER NOTES
"Subjective   Chief Complaint   Patient presents with    Vaginal Bleeding       History of Present Illness  Patient is a 19-year-old female presents ED with complaint of vaginal bleeding that became worse today.  Recently seen in the ED for a miscarriage, reports that she feels there is \"something hanging out\".    Patient denies any chest pain or shortness of breath.  No episodes of lightheadedness or syncope.  No fevers.  Review of Systems  Per HPI  Past Medical History:   Diagnosis Date    Anxiety     Asthma     Deep vein thrombosis     Eating disorder     Low back pain        Allergies   Allergen Reactions    Aloe Other (See Comments)     Skin irritation and burning    Sunscreens Other (See Comments)     rash       No past surgical history on file.    Family History   Problem Relation Age of Onset    Depression Mother     Depression Father     Alcohol abuse Father     Cancer Maternal Aunt         breast       Social History     Socioeconomic History    Marital status: Single   Tobacco Use    Smoking status: Former    Smokeless tobacco: Never   Vaping Use    Vaping status: Every Day    Substances: Nicotine   Substance and Sexual Activity    Alcohol use: No    Drug use: No           Objective   Physical Exam  Vitals and nursing note reviewed. Exam conducted with a chaperone present.   Constitutional:       Appearance: Normal appearance.   HENT:      Head: Normocephalic and atraumatic.      Mouth/Throat:      Mouth: Mucous membranes are moist.      Pharynx: Oropharynx is clear.   Eyes:      Extraocular Movements: Extraocular movements intact.      Conjunctiva/sclera: Conjunctivae normal.      Pupils: Pupils are equal, round, and reactive to light.   Cardiovascular:      Rate and Rhythm: Regular rhythm. Tachycardia present.      Heart sounds: Normal heart sounds.   Pulmonary:      Effort: Pulmonary effort is normal.      Breath sounds: Normal breath sounds.   Abdominal:      General: Bowel sounds are normal.      " Palpations: Abdomen is soft.      Tenderness: There is no abdominal tenderness. There is no guarding or rebound.      Comments: No peritoneal signs   Genitourinary:     Comments: She was placed in the lithotomy position external genitalia were found to have no lesions or swelling.  Patient noted to have tissue with what appears to be sac protruding from vagina prior to speculum exam.  This was easily removed utilizing 4 x 4 gauze.  Laced in specimen container, sent to lab.  Speculum exam was performed, blood did feel the vaginal vault, noted to have clots and tissue which was removed with forceps, as well as blood cleared with 4 x 4's.  Noted to have some tissue protruding from the cervix, however this caused pain with movement.  Exam chaperoned with [facility].  Bleeding did appear to slow with removal of larger clots.  Skin:     General: Skin is warm and dry.      Capillary Refill: Capillary refill takes less than 2 seconds.   Neurological:      Mental Status: She is alert.         Procedures           ED Course  ED Course as of 09/01/24 0509   Sun Sep 01, 2024   0205 Repeat exam [LB]   0245 Repaged OB/GYN   [LB]   0307 Awaiting ob gyn callback [LB]   0314 Spoke with Dr. Ricks [LB]   0320 Discussed with Dr. Ricks. She is going to review chart and call back [LB]   0327 Discussed with Dr. Ricks.   Admit to gyn  Give cytotec 800mcg SL now  Methergin 0.2mg po now [LB]   0344 Updated patient on plan of care. [LB]   0403 Pt refused medication. Nursing to notify gyn.,  [LB]      ED Course User Index  [LB] Stacie Retana, ELIEZER      Vitals:    09/01/24 0503   BP: 104/63   Pulse: 110   Resp:    Temp:    SpO2: 97%     Medications   sodium chloride 0.9 % flush 10 mL (has no administration in time range)   methylergonovine (METHERGINE) tablet 200 mcg (200 mcg Oral Not Given 9/1/24 0403)   miSOPROStol (CYTOTEC) tablet 800 mcg (800 mcg Sublingual Not Given 9/1/24 0403)   sodium chloride 0.9 % bolus 1,000  mL (0 mL Intravenous Stopped 24)   sodium chloride 0.9 % bolus 1,000 mL (0 mL Intravenous Stopped 24)                                            Medical Decision Making  Problems Addressed:  Spontaneous : complicated acute illness or injury  Vaginal bleeding: complicated acute illness or injury    Amount and/or Complexity of Data Reviewed  Labs: ordered.  Radiology: ordered.    Risk  Prescription drug management.  Decision regarding hospitalization.    Chart Review: Previous ED visit for similar symptoms.  Imaging: US Ob Transvaginal    Result Date: 2024  Impression: Blood products and debris within the endometrial cavity. No evidence of gestational sac. Electronically Signed: Fei Walsh MD  2024 1:50 AM EDT  Workstation ID: [id number]    US Ob < 14 Weeks Single or First Gestation    Result Date: 2024  Impression: Blood products and debris within the endometrial cavity. No evidence of gestational sac. Electronically Signed: Fei Walsh MD  2024 1:50 AM EDT  Workstation ID: [id number]       Pt was Placed on appropriate monitoring.  Differential diagnoses considered for patient presentation, this list is not all inclusive of diagnoses considered: Hemorrhage, retained products  Patient presents to the ED for the above complaint, underwent the above, exam and workup notable for, patient noted to have significant tachycardia on initial exam.  Initiated IV fluids.  White blood cell count 14.4, hemoglobin 10.4, down from 11.1.  CMP reviewed.  Quantitative hCG 1032.  Patient later disclosed that she was unable to get her Methergine that was prescribed, she does report it was too expensive.  Repeat vaginal exam with speculum, completed with Jaycee ALEXANDER at bedside, bleeding is improved, blood will still in the vaginal vault, but is cleared with one 4 x 4, and does not fully feel the vaginal vault during the remainder of the exam.  I consulted with gynecology, spoke with Dr. Ricks,  please see workup, she reviewed the patient's chart, then call back, orders placed per her recommendation for Methergine and Cytotec.  Patient will be admitted to their service.  Disposition: I discussed with the patient their test results, work-up here in the emergency department, and need for admission and further evaluation. Patient is agreeable to the plan of care. At time of disposition patient's VS are reviewed, and patient without acute distress.  Opportunity was provided for questions at the bedside, all questions and concerns were addressed.  Note Disclaimer: At [facility], we believe that sharing information builds trust and better relationships. You are receiving this note because you recently visited [facility]. It is possible you will see health information before a provider has talked with you about it. This kind of information can be easy to misunderstand. To help you fully understand what it means for your health, we urge you to discuss this note with your provider  Note dictated utilizing Dragon Dictation.  Appropriate PPE worn during patient interactions.        Final diagnoses:   Vaginal bleeding   Spontaneous        ED Disposition  ED Disposition       ED Disposition   Decision to Admit    Condition   --    Comment   Level of Care: Med/Surg [1]   Diagnosis: Vaginal bleeding [859531]                 No follow-up provider specified.       Medication List      No changes were made to your prescriptions during this visit.            Stacie Retana, APRN  [id number] [phone]

## 2024-09-01 NOTE — ANESTHESIA PROCEDURE NOTES
Peripheral IV    Patient location during procedure: OR  Start time: 9/1/2024 1:52 PM  Line placed for Difficult Access and Fluids/Medication Admin.  Performed By   CRNA/CAA: Kari Luo CRNA  Preanesthetic Checklist  Completed: patient identified, IV checked, site marked, risks and benefits discussed, surgical consent, monitors and equipment checked, pre-op evaluation and timeout performed  Peripheral IV Prep   Patient position: supine   Prep: alcohol swabs  Patient monitoring: heart rate, cardiac monitor and continuous pulse ox  Peripheral IV Procedure   Laterality:left  Location:  Antecubital  Catheter size: 20 G          Post Assessment   Dressing Type: tape and transparent.    IV Dressing/Site: clean, dry and intact

## 2024-09-01 NOTE — Clinical Note
Level of Care: Med/Surg [1]   Admitting Physician: BRIDGETTE GAVIRIA [[id number]]   Attending Physician: BRIDGETTE GAVIRIA [[id number]]

## 2024-09-01 NOTE — H&P
[facility]   HISTORY AND PHYSICAL    Patient Name:Fanny Madison  : 2005  MRN: [id number]  Primary Care Physician: Provider, No Known  Date of admission: 2024    Subjective   Subjective     Chief Complaint:     Vaginal Bleeding  Associated symptoms include headaches. Pertinent negatives include no chills or fever.     Fanny Madison is a 19 y.o. female  who was admitted from ED overnight after heavy vaginal bleeding after taking her doses of misoprostol. She was seen in ED  in which pain and bleeding improved with pain meds and methergine. However, she had another episode of heavy bleeding and passage of tissue in ED overnight. She has continued bleeding that has slowed, but has requested surgical mgmt at this time. She is scheduled for DILATATION AND CURETTAGE WITH SUCTION ultrasound guidance (N/A)    Patient Active Problem List   Diagnosis    Anxiety    Depression    Vaginal bleeding       Review of Systems   Constitutional:  Positive for fatigue. Negative for chills and fever.   Respiratory: Negative.     Cardiovascular: Negative.    Genitourinary:  Positive for vaginal bleeding.   Neurological:  Positive for headaches. Negative for dizziness and light-headedness.        Personal History     Past Medical History:   Diagnosis Date    Anxiety     Asthma     Deep vein thrombosis     Eating disorder     Low back pain        History reviewed. No pertinent surgical history.    Obstetric History:  OB History          1    Para        Term                AB        Living             SAB        IAB        Ectopic        Molar        Multiple        Live Births                   Menstrual History:     No LMP recorded.       # 1 - Date: None, Sex: None, Weight: None, GA: None, Type: None, Apgar1: None, Apgar5: None, Living: None, Birth Comments: None      Family History: Her family history includes Alcohol abuse in her father; Cancer in her maternal aunt; Depression in her father  and mother.     Social History: She  reports that she has quit smoking. She has never used smokeless tobacco. She reports that she does not drink alcohol and does not use drugs.    Home Medications:       Allergies:  She is allergic to aloe and sunscreens.    Objective    Objective     Vitals:    Temp:  [97.8 °F (36.6 °C)-99.2 °F (37.3 °C)] 97.8 °F (36.6 °C)  Heart Rate:  [] 94  Resp:  [11-18] 14  BP: ()/(63-85) 96/63    Physical Exam  Constitutional:       General: She is not in acute distress.     Appearance: Normal appearance.   HENT:      Head: Normocephalic.   Eyes:      Extraocular Movements: Extraocular movements intact.      Pupils: Pupils are equal, round, and reactive to light.   Cardiovascular:      Rate and Rhythm: Normal rate.   Pulmonary:      Effort: Pulmonary effort is normal.   Abdominal:      General: Abdomen is flat. There is no distension.      Palpations: Abdomen is soft.      Tenderness: There is abdominal tenderness (mildly ttp). There is no guarding or rebound.   Genitourinary:     Comments: Deferred to OR  Musculoskeletal:         General: Normal range of motion.      Cervical back: Normal range of motion.   Skin:     General: Skin is warm and dry.      Coloration: Skin is pale.   Neurological:      General: No focal deficit present.      Mental Status: She is alert and oriented to person, place, and time.        Latest Reference Range & Units 09/01/24 00:52 09/01/24 09:12   WBC 3.40 - 10.80 10*3/mm3 14.44 (H) 8.62   RBC 3.77 - 5.28 10*6/mm3 3.17 (L) 2.24 (L)   Hemoglobin 12.0 - 15.9 g/dL 10.4 (L) 7.7 (L)   Hematocrit 34.0 - 46.6 % 30.3 (L) 21.6 (L)   Platelets 140 - 450 10*3/mm3 279 169   RDW 12.3 - 15.4 % 11.7 (L) 11.9 (L)   MCV 79.0 - 97.0 fL 95.6 96.4   MCH 26.6 - 33.0 pg 32.8 34.4 (H)   MCHC 31.5 - 35.7 g/dL 34.3 35.6   MPV 6.0 - 12.0 fL 8.7 8.7   RDW-SD 37.0 - 54.0 fl 40.5 41.3   (H): Data is abnormally high  (L): Data is abnormally low    Assessment & Plan   Assessment /  Plan     Brief Patient Summary:  Fanny Madison is a 19 y.o. female who presents for preoperative evaluation.    * No Diagnosis Codes entered *    Active Hospital Problems:  Active Hospital Problems    Diagnosis     **Vaginal bleeding      Plan:   Procedure(s):  DILATATION AND CURETTAGE WITH SUCTION ultrasound guidance    The risks, benefits, and alternatives of the procedure are reviewed with the patient including but not limited to bleeding, infection and damage to internal organs.    Questions and concerns were addressed.     Due to her anemia with Hgb 7.7 this AM, will have pt receive IV iron postoperatively and repeat labs in AM.    Azalia Ricks MD

## 2024-09-01 NOTE — ED NOTES
Nursing report ED to floor  Fanny Madison  19 y.o.  female    HPI:   Chief Complaint   Patient presents with    Vaginal Bleeding       Admitting doctor:   Azalia Ricks MD    Admitting diagnosis:   The primary encounter diagnosis was Vaginal bleeding. A diagnosis of Spontaneous  was also pertinent to this visit.    Code status:   Current Code Status       Date Active Code Status Order ID Comments User Context       2024 0334 CPR (Attempt to Resuscitate) [id number]  Azalia Ricks MD ED        Question Answer    Code Status (Patient has no pulse and is not breathing) CPR (Attempt to Resuscitate)    Medical Interventions (Patient has pulse or is breathing) Full Support                    Allergies:   Aloe and Sunscreens    Isolation:  No active isolations     Fall Risk:  Fall Risk Assessment was completed, and patient is at moderate risk for falls.   Predictive Model Details         1 (Low) Factor Value    Calculated 2024 05:21 Active Peripheral IV Present    Risk of Fall Model Imaging order in this encounter Present     Respiratory Rate 18     Magnesium not on file     Diastolic BP 67     Kurtis Scale not on file     Age 19     Number of Distinct Medication Classes administered 1     Cardiac Assessment X     Creatinine 0.58 mg/dL     Days after Admission 0.201     ALT 6 U/L     Albumin 4.3 g/dL     Chloride 106 mmol/L     Tobacco Use Quit     Calcium 9.5 mg/dL     Total Bilirubin 0.2 mg/dL     Potassium 3.7 mmol/L         Weight:       24  0030   Weight: 54.4 kg (119 lb 14.9 oz)       Intake and Output    Intake/Output Summary (Last 24 hours) at 2024 0524  Last data filed at 2024 0314  Gross per 24 hour   Intake 2000 ml   Output --   Net 2000 ml       Diet:   Dietary Orders (From admission, onward)       Start     Ordered    24  NPO Diet NPO Type: Strict NPO  Diet Effective Now        Question:  NPO Type  Answer:  Strict NPO    24 9480                      Most recent vitals:   Vitals:    09/01/24 0432 09/01/24 0448 09/01/24 0503 09/01/24 0517   BP: 118/73 113/66 104/63 106/67   Pulse: (!) 122 112 110 111   Resp:       Temp:       SpO2: 97% 98% 97% 99%   Weight:       Height:           Active LDAs/IV Access:   Lines, Drains & Airways       Active LDAs       Name Placement date Placement time Site Days    Peripheral IV 09/01/24 0053 Left Antecubital 09/01/24 0053  Antecubital  less than 1    Peripheral IV 09/01/24 0054 Right Antecubital 09/01/24 0054  Antecubital  less than 1                    Skin Condition:   Skin Assessments (last day)       Date/Time Skin WDL    09/01/24 01:07:12 WDL             Labs (abnormal labs have a star):   Labs Reviewed   COMPREHENSIVE METABOLIC PANEL - Abnormal; Notable for the following components:       Result Value    Glucose 111 (*)     All other components within normal limits    Narrative:     GFR Normal >60  Chronic Kidney Disease <60  Kidney Failure <15     CBC WITH AUTO DIFFERENTIAL - Abnormal; Notable for the following components:    WBC 14.44 (*)     RBC 3.17 (*)     Hemoglobin 10.4 (*)     Hematocrit 30.3 (*)     RDW 11.7 (*)     Lymphocyte % 18.0 (*)     Neutrophils, Absolute 10.79 (*)     Immature Grans, Absolute 0.07 (*)     All other components within normal limits   HCG, QUANTITATIVE, PREGNANCY    Narrative:     HCG Ranges by Gestational Age    Females - non-pregnant premenopausal   </= 1mIU/mL HCG  Females - postmenopausal               </= 7mIU/mL HCG    3 Weeks       5.4   -      72 mIU/mL  4 Weeks      10.2   -     708 mIU/mL  5 Weeks       217   -   8,245 mIU/mL  6 Weeks       152   -  32,177 mIU/mL  7 Weeks     4,059   - 153,767 mIU/mL  8 Weeks    31,366   - 149,094 mIU/mL  9 Weeks    59,109   - 135,901 mIU/mL  10 Weeks   44,186   - 170,409 mIU/mL  12 Weeks   27,107   - 201,615 mIU/mL  14 Weeks   24,302   -  93,646 mIU/mL  15 Weeks   12,540   -  69,747 mIU/mL  16 Weeks    8,904   -  55,332 mIU/mL  17 Weeks     8,240   -  51,793 mIU/mL  18 Weeks    9,649   -  55,271 mIU/mL     TYPE AND SCREEN   TISSUE PATHOLOGY EXAM   CBC AND DIFFERENTIAL    Narrative:     The following orders were created for panel order CBC & Differential.  Procedure                               Abnormality         Status                     ---------                               -----------         ------                     CBC Auto Differential[[id number]]        Abnormal            Final result                 Please view results for these tests on the individual orders.   EXTRA TUBES    Narrative:     The following orders were created for panel order Extra Tubes.  Procedure                               Abnormality         Status                     ---------                               -----------         ------                     Light Blue Top[[id number]]                                   Final result                 Please view results for these tests on the individual orders.   LIGHT BLUE TOP       LOC: Person, Place, Time, and Situation    Telemetry:  Med/Surg    Cardiac Monitoring Ordered: yes    EKG:   No orders to display       Medications Given in the ED:   Medications   sodium chloride 0.9 % flush 10 mL (has no administration in time range)   methylergonovine (METHERGINE) tablet 200 mcg (200 mcg Oral Not Given 9/1/24 0403)   miSOPROStol (CYTOTEC) tablet 800 mcg (800 mcg Sublingual Not Given 9/1/24 0403)   sodium chloride 0.9 % bolus 1,000 mL (0 mL Intravenous Stopped 9/1/24 0314)   sodium chloride 0.9 % bolus 1,000 mL (0 mL Intravenous Stopped 9/1/24 0314)       Imaging results:  US Ob Transvaginal    Result Date: 9/1/2024  Impression: Blood products and debris within the endometrial cavity. No evidence of gestational sac. Electronically Signed: Fei Walsh MD  9/1/2024 1:50 AM EDT  Workstation ID: [id number]    US Ob < 14 Weeks Single or First Gestation    Result Date: 9/1/2024  Impression: Blood products and debris within the  endometrial cavity. No evidence of gestational sac. Electronically Signed: Fei Walsh MD  9/1/2024 1:50 AM EDT  Workstation ID: [id number]     Social issues:   Social History     Socioeconomic History    Marital status: Single   Tobacco Use    Smoking status: Former    Smokeless tobacco: Never   Vaping Use    Vaping status: Every Day    Substances: Nicotine   Substance and Sexual Activity    Alcohol use: No    Drug use: No       NIH Stroke Scale:  Interval: (not recorded)  1a. Level of Consciousness: (not recorded)  1b. LOC Questions: (not recorded)  1c. LOC Commands: (not recorded)  2. Best Gaze: (not recorded)  3. Visual: (not recorded)  4. Facial Palsy: (not recorded)  5a. Motor Arm, Left: (not recorded)  5b. Motor Arm, Right: (not recorded)  6a. Motor Leg, Left: (not recorded)  6b. Motor Leg, Right: (not recorded)  7. Limb Ataxia: (not recorded)  8. Sensory: (not recorded)  9. Best Language: (not recorded)  10. Dysarthria: (not recorded)  11. Extinction and Inattention (formerly Neglect): (not recorded)    Total (NIH Stroke Scale): (not recorded)     Additional notable assessment information:     Nursing report ED to floor:  206    Jaycee Gramajo RN   09/01/24 05:24 EDT

## 2024-09-01 NOTE — PLAN OF CARE
Goal Outcome Evaluation:  Plan of Care Reviewed With: patient        Progress: improving  Outcome Evaluation: This morning patient was complaining to me about her IV in her right AC annoying her, and she wanted it removed. IV was removed before her D&C and a new one was placed in OR in her left AC. Patient currently getting a D&C in the OR, which is taking longer than expected. She will be in recovery for an hour before she comes back to the unit. Seems to express distaste with BHF in general.

## 2024-09-02 VITALS
TEMPERATURE: 98.3 F | BODY MASS INDEX: 22.64 KG/M2 | HEIGHT: 61 IN | HEART RATE: 70 BPM | RESPIRATION RATE: 20 BRPM | SYSTOLIC BLOOD PRESSURE: 94 MMHG | WEIGHT: 119.93 LBS | DIASTOLIC BLOOD PRESSURE: 55 MMHG | OXYGEN SATURATION: 99 %

## 2024-09-02 LAB
HCT VFR BLD AUTO: 21.5 % (ref 34–46.6)
HGB BLD-MCNC: 7.4 G/DL (ref 12–15.9)

## 2024-09-02 PROCEDURE — 85014 HEMATOCRIT: CPT | Performed by: STUDENT IN AN ORGANIZED HEALTH CARE EDUCATION/TRAINING PROGRAM

## 2024-09-02 PROCEDURE — G0378 HOSPITAL OBSERVATION PER HR: HCPCS

## 2024-09-02 PROCEDURE — 85018 HEMOGLOBIN: CPT | Performed by: STUDENT IN AN ORGANIZED HEALTH CARE EDUCATION/TRAINING PROGRAM

## 2024-09-02 PROCEDURE — 25810000003 LACTATED RINGERS PER 1000 ML: Performed by: STUDENT IN AN ORGANIZED HEALTH CARE EDUCATION/TRAINING PROGRAM

## 2024-09-02 RX ORDER — TRAMADOL HYDROCHLORIDE 50 MG/1
50 TABLET ORAL EVERY 12 HOURS PRN
Qty: 6 TABLET | Refills: 0 | Status: SHIPPED | OUTPATIENT
Start: 2024-09-02

## 2024-09-02 RX ORDER — ACETAMINOPHEN 325 MG/1
650 TABLET ORAL EVERY 6 HOURS PRN
Qty: 30 TABLET | Refills: 1 | Status: SHIPPED | OUTPATIENT
Start: 2024-09-02

## 2024-09-02 RX ORDER — IBUPROFEN 600 MG/1
600 TABLET, FILM COATED ORAL EVERY 6 HOURS PRN
Qty: 30 TABLET | Refills: 1 | Status: SHIPPED | OUTPATIENT
Start: 2024-09-02

## 2024-09-02 RX ADMIN — SODIUM CHLORIDE, POTASSIUM CHLORIDE, SODIUM LACTATE AND CALCIUM CHLORIDE 125 ML/HR: 600; 310; 30; 20 INJECTION, SOLUTION INTRAVENOUS at 03:10

## 2024-09-02 RX ADMIN — HYDROCODONE BITARTRATE AND ACETAMINOPHEN 1 TABLET: 5; 325 TABLET ORAL at 03:10

## 2024-09-02 RX ADMIN — HYDROCODONE BITARTRATE AND ACETAMINOPHEN 1 TABLET: 5; 325 TABLET ORAL at 11:27

## 2024-09-02 NOTE — DISCHARGE SUMMARY
Date of Discharge:  2024    Presenting Problem/History of Present Illness:  19 y.o. female  currently at Unknown  Incomplete   S/P suction dilation and curettage       Discharge Diagnosis: status post suction D&C       Hospital Course:  Patient is a 19 y.o. female  currently 1 Day post op from suction D&C from vaginal bleeding with incomplete .   Patient currently is tolerating PO. She also denies headache, dizziness, vision changes, chest pain, or dyspnea. She has no other concerns, reports light spotting since surgery. She reports cramping pain, otherwise no other concerns. This was a desired pregnancy so she does not want any contraception therapy. Hb 7.4 post surgery, EBL 250cc  in surgery. Plan for ferrous iron BID. No symptoms of anemia at time of discharge.     Pertinent Test Results:   Lab Results (last 72 hours)       Procedure Component Value Units Date/Time    Tissue Pathology Exam [[id number]] Collected: 24 1407    Specimen: Tissue from Products of Conception Updated: 24 1258    Hemoglobin & Hematocrit, Blood [[id number]]  (Abnormal) Collected: 24 0555    Specimen: Blood Updated: 24 0625     Hemoglobin 7.4 g/dL      Hematocrit 21.5 %     Hemoglobin & Hematocrit, Blood [[id number]]  (Abnormal) Collected: 24 1531    Specimen: Blood from Arm, Right Updated: 24 1538     Hemoglobin 8.8 g/dL      Hematocrit 25.9 %     CBC & Differential [[id number]]  (Abnormal) Collected: 24 0912    Specimen: Blood Updated: 24 0938    Narrative:      The following orders were created for panel order CBC & Differential.  Procedure                               Abnormality         Status                     ---------                               -----------         ------                     CBC Auto Differential[[id number]]        Abnormal            Final result                 Please view results for these tests on the individual orders.    CBC Auto  Differential [[id number]]  (Abnormal) Collected: 09/01/24 0912    Specimen: Blood Updated: 09/01/24 0938     WBC 8.62 10*3/mm3      RBC 2.24 10*6/mm3      Hemoglobin 7.7 g/dL      Hematocrit 21.6 %      MCV 96.4 fL      MCH 34.4 pg      MCHC 35.6 g/dL      RDW 11.9 %      RDW-SD 41.3 fl      MPV 8.7 fL      Platelets 169 10*3/mm3      Neutrophil % 68.8 %      Lymphocyte % 23.1 %      Monocyte % 7.1 %      Eosinophil % 0.6 %      Basophil % 0.2 %      Immature Grans % 0.2 %      Neutrophils, Absolute 5.93 10*3/mm3      Lymphocytes, Absolute 1.99 10*3/mm3      Monocytes, Absolute 0.61 10*3/mm3      Eosinophils, Absolute 0.05 10*3/mm3      Basophils, Absolute 0.02 10*3/mm3      Immature Grans, Absolute 0.02 10*3/mm3      nRBC 0.0 /100 WBC     Tissue Pathology Exam [[id number]] Collected: 09/01/24 0102    Specimen: Tissue from Vagina Updated: 09/01/24 0133    Comprehensive Metabolic Panel [[id number]]  (Abnormal) Collected: 09/01/24 0052    Specimen: Blood Updated: 09/01/24 0123     Glucose 111 mg/dL      BUN 10 mg/dL      Creatinine 0.58 mg/dL      Sodium 141 mmol/L      Potassium 3.7 mmol/L      Chloride 106 mmol/L      CO2 23.7 mmol/L      Calcium 9.5 mg/dL      Total Protein 6.8 g/dL      Albumin 4.3 g/dL      ALT (SGPT) 6 U/L      AST (SGOT) 21 U/L      Alkaline Phosphatase 40 U/L      Total Bilirubin 0.2 mg/dL      Globulin 2.5 gm/dL      A/G Ratio 1.7 g/dL      BUN/Creatinine Ratio 17.2     Anion Gap 11.3 mmol/L      eGFR 132.2 mL/min/1.73     Narrative:      GFR Normal >60  Chronic Kidney Disease <60  Kidney Failure <15      hCG, Quantitative, Pregnancy [[id number]] Collected: 09/01/24 0052    Specimen: Blood Updated: 09/01/24 0121     HCG Quantitative 1,032.00 mIU/mL     Narrative:      HCG Ranges by Gestational Age    Females - non-pregnant premenopausal   </= 1mIU/mL HCG  Females - postmenopausal               </= 7mIU/mL HCG    3 Weeks       5.4   -      72 mIU/mL  4 Weeks      10.2   -     708 mIU/mL  5 Weeks        217   -   8,245 mIU/mL  6 Weeks       152   -  32,177 mIU/mL  7 Weeks     4,059   - 153,767 mIU/mL  8 Weeks    31,366   - 149,094 mIU/mL  9 Weeks    59,109   - 135,901 mIU/mL  10 Weeks   44,186   - 170,409 mIU/mL  12 Weeks   27,107   - 201,615 mIU/mL  14 Weeks   24,302   -  93,646 mIU/mL  15 Weeks   12,540   -  69,747 mIU/mL  16 Weeks    8,904   -  55,332 mIU/mL  17 Weeks    8,240   -  51,793 mIU/mL  18 Weeks    9,649   -  55,271 mIU/mL      Extra Tubes [[id number]] Collected: 09/01/24 0052    Specimen: Blood, Venous Line Updated: 09/01/24 0116    Narrative:      The following orders were created for panel order Extra Tubes.  Procedure                               Abnormality         Status                     ---------                               -----------         ------                     Light Blue Top[[id number]]                                   Final result                 Please view results for these tests on the individual orders.    Light Blue Top [[id number]] Collected: 09/01/24 0052    Specimen: Blood Updated: 09/01/24 0116     Extra Tube Hold for add-ons.     Comment: Auto resulted       CBC & Differential [[id number]]  (Abnormal) Collected: 09/01/24 0052    Specimen: Blood Updated: 09/01/24 0104    Narrative:      The following orders were created for panel order CBC & Differential.  Procedure                               Abnormality         Status                     ---------                               -----------         ------                     CBC Auto Differential[[id number]]        Abnormal            Final result                 Please view results for these tests on the individual orders.    CBC Auto Differential [[id number]]  (Abnormal) Collected: 09/01/24 0052    Specimen: Blood Updated: 09/01/24 0104     WBC 14.44 10*3/mm3      RBC 3.17 10*6/mm3      Hemoglobin 10.4 g/dL      Hematocrit 30.3 %      MCV 95.6 fL      MCH 32.8 pg      MCHC 34.3 g/dL      RDW 11.7 %      RDW-SD 40.5  fl      MPV 8.7 fL      Platelets 279 10*3/mm3      Neutrophil % 74.7 %      Lymphocyte % 18.0 %      Monocyte % 6.0 %      Eosinophil % 0.5 %      Basophil % 0.3 %      Immature Grans % 0.5 %      Neutrophils, Absolute 10.79 10*3/mm3      Lymphocytes, Absolute 2.60 10*3/mm3      Monocytes, Absolute 0.87 10*3/mm3      Eosinophils, Absolute 0.07 10*3/mm3      Basophils, Absolute 0.04 10*3/mm3      Immature Grans, Absolute 0.07 10*3/mm3      nRBC 0.0 /100 WBC           Imaging Results (Last 72 Hours)       Procedure Component Value Units Date/Time    US Ob Transvaginal [[id number]] Collected: 24     Updated: 24    Narrative:      US OB TRANSVAGINAL, US OB < 14 WEEKS SINGLE OR FIRST GESTATION    Date of Exam: 2024 1:02 AM EDT    Indication: vaginal bleeding, recent spontanous .    Comparison: OB ultrasound 2024    Technique: Transvaginal ultrasound was performed to evaluate pregnancy.  Real time scanning was performed with multiple image documentation per protocol.        Findings:  The uterus is 8.7 x 4.8 x 6.3 cm. Again seen is complex debris and echogenic blood products within the endometrial cavity up to at least 2.1 cm. There are no myometrial lesions identified. The right ovary is 3.3 x 2.2 x 3.1 cm. The left ovary 3.4 x 2.3 x   3.4 cm. Both ovaries demonstrate normal color blood flow. There is a benign cyst the left ovary. There is trace free fluid in the pelvic cul-de-sac. There is no evidence of a gestational sac, yolk sac or pregnancy. There is no cardiac motion or evidence   of a heart.        Impression:      Impression:  Blood products and debris within the endometrial cavity. No evidence of gestational sac.        Electronically Signed: Fei Walsh MD    2024 1:50 AM EDT    Workstation ID: [id number]    US Ob < 14 Weeks Single or First Gestation [[id number]] Collected: 24     Updated: 24    Narrative:      US OB TRANSVAGINAL, US OB < 14 WEEKS  SINGLE OR FIRST GESTATION    Date of Exam: 2024 1:02 AM EDT    Indication: vaginal bleeding, recent spontanous .    Comparison: OB ultrasound 2024    Technique: Transvaginal ultrasound was performed to evaluate pregnancy.  Real time scanning was performed with multiple image documentation per protocol.        Findings:  The uterus is 8.7 x 4.8 x 6.3 cm. Again seen is complex debris and echogenic blood products within the endometrial cavity up to at least 2.1 cm. There are no myometrial lesions identified. The right ovary is 3.3 x 2.2 x 3.1 cm. The left ovary 3.4 x 2.3 x   3.4 cm. Both ovaries demonstrate normal color blood flow. There is a benign cyst the left ovary. There is trace free fluid in the pelvic cul-de-sac. There is no evidence of a gestational sac, yolk sac or pregnancy. There is no cardiac motion or evidence   of a heart.        Impression:      Impression:  Blood products and debris within the endometrial cavity. No evidence of gestational sac.        Electronically Signed: Fei Walsh MD    2024 1:50 AM EDT    Workstation ID: [id number]            Condition on Discharge:  Good    Vital Signs:  Vitals:    24 0500 24 0600 24 0823 24 1139   BP:   93/51 94/55   BP Location:   Left arm Left arm   Patient Position:   Lying Lying   Pulse: 70      Resp:  18 18 20   Temp:   98.5 °F (36.9 °C) 98.3 °F (36.8 °C)   TempSrc:   Oral Oral   SpO2: 99%      Weight:       Height:           Physical Exam:     General Appearance:    Alert, cooperative, in no acute distress   Lungs:     Clear to auscultation,respirations regular, even and                   unlabored    Heart:    Regular rhythm and normal rate.        Abdomen:    Soft, ttp in lower abdomen, no guarding, no rebound tenderness, bowel gases present   Pelvic Exam:    deferred   Extremities:       Moves all extremities well, no edema, no cyanosis, no             redness          Discharge Disposition:  Home  Discharge  home and FU in 2 weeks     Discharge Medications:     Discharge Medications        New Medications        Instructions Start Date   acetaminophen 325 MG tablet  Commonly known as: TYLENOL   650 mg, Oral, Every 6 Hours PRN      ibuprofen 600 MG tablet  Commonly known as: ADVIL,MOTRIN   600 mg, Oral, Every 6 Hours PRN      traMADol 50 MG tablet  Commonly known as: ULTRAM   50 mg, Oral, Every 12 Hours PRN               Follow-up Appointments  Follow-up appointment with Dr. Azalia Ricks in 2 weeks    Test Results Pending at Discharge  Pending Labs       Order Current Status    Tissue Pathology Exam In process    Tissue Pathology Exam In process             Aubrie Lanier MD  09/02/24  15:28 EDT

## 2024-09-02 NOTE — PLAN OF CARE
Problem: Adult Inpatient Plan of Care  Goal: Plan of Care Review  Outcome: Met  Goal: Patient-Specific Goal (Individualized)  Outcome: Met  Goal: Absence of Hospital-Acquired Illness or Injury  Outcome: Met  Intervention: Identify and Manage Fall Risk  Recent Flowsheet Documentation  Taken 9/2/2024 0811 by Randall Shields RN  Safety Promotion/Fall Prevention: safety round/check completed  Intervention: Prevent Skin Injury  Recent Flowsheet Documentation  Taken 9/2/2024 0811 by Randall Shields RN  Body Position: position changed independently  Intervention: Prevent and Manage VTE (Venous Thromboembolism) Risk  Recent Flowsheet Documentation  Taken 9/2/2024 0811 by Randall Shields RN  Activity Management: up ad indio  Range of Motion: active ROM (range of motion) encouraged  Goal: Optimal Comfort and Wellbeing  Outcome: Met  Intervention: Monitor Pain and Promote Comfort  Recent Flowsheet Documentation  Taken 9/2/2024 0811 by Randall Shields RN  Pain Management Interventions:   see MAR   quiet environment facilitated   pillow support provided  Intervention: Provide Person-Centered Care  Recent Flowsheet Documentation  Taken 9/2/2024 0811 by Randall Shields RN  Trust Relationship/Rapport:   care explained   choices provided   questions answered   questions encouraged  Goal: Readiness for Transition of Care  Outcome: Met     Problem: Pain Acute  Goal: Acceptable Pain Control and Functional Ability  Outcome: Met  Intervention: Prevent or Manage Pain  Recent Flowsheet Documentation  Taken 9/2/2024 0811 by Randall Shields RN  Sensory Stimulation Regulation: care clustered  Bowel Elimination Promotion:   adequate fluid intake promoted   ambulation promoted  Sleep/Rest Enhancement: awakenings minimized  Intervention: Develop Pain Management Plan  Recent Flowsheet Documentation  Taken 9/2/2024 0811 by Randall Shields RN  Pain Management Interventions:   see MAR   quiet environment facilitated   pillow support provided  Intervention:  Optimize Psychosocial Wellbeing  Recent Flowsheet Documentation  Taken 9/2/2024 0811 by Randall Shields, RN  Supportive Measures: active listening utilized  Diversional Activities:   smartphone   television   Goal Outcome Evaluation:      Discharge home under self care.  VSS.  No distress present.  PIVs removed.  Instructions reviewed.

## 2024-09-03 LAB
BASE DEFICIT: ABNORMAL
BASE EXCESS BLDV CALC-SCNC: <0 MMOL/L (ref 0–3)
CA-I BLDA-SCNC: 1.2 MMOL/L (ref 1.12–1.32)
CO2 CONTENT VENOUS: 22 MMOL/L (ref 24–29)
GLUCOSE BLDC GLUCOMTR-MCNC: 99 MG/DL (ref 70–105)
HCO3 BLDV-SCNC: 20.9 MMOL/L (ref 23–28)
HCT VFR BLDA CALC: 21 % (ref 38–51)
HGB BLDA-MCNC: 7.1 G/DL (ref 12–17)
PCO2 BLDV: 35.7 MM HG (ref 41–51)
PH BLDV: 7.38 PH UNITS (ref 7.31–7.41)
PO2 BLDV: 270 MM HG (ref 35–42)
POTASSIUM BLDA-SCNC: 3.4 MMOL/L (ref 3.5–4.9)
SAO2 % BLDCOV: ABNORMAL %
SODIUM BLD-SCNC: 139 MMOL/L (ref 138–146)

## 2024-09-03 NOTE — CASE MANAGEMENT/SOCIAL WORK
Case Management Discharge Note      Final Note: Routine home         Selected Continued Care - Discharged on 9/2/2024 Admission date: 9/1/2024 - Discharge disposition: Home or Self Care             Transportation Services  Private: Car    Final Discharge Disposition Code: 01 - home or self-care

## 2024-09-04 LAB
LAB AP CASE REPORT: NORMAL
LAB AP CASE REPORT: NORMAL
PATH REPORT.FINAL DX SPEC: NORMAL
PATH REPORT.FINAL DX SPEC: NORMAL
PATH REPORT.GROSS SPEC: NORMAL
PATH REPORT.GROSS SPEC: NORMAL

## (undated) DEVICE — SYS FLUID MGMT HYST SAFETOUCH

## (undated) DEVICE — ST COL BERKELY TBG

## (undated) DEVICE — TBG W FLTR FOR BERKELEY SYSTEM

## (undated) DEVICE — PAD,NON-ADHERENT,3X8,STERILE,LF,1/PK: Brand: MEDLINE

## (undated) DEVICE — GLV SURG SIGNATURE ESSENTIAL PF LTX SZ7.5

## (undated) DEVICE — PK MINOR LITHOTOMY 50

## (undated) DEVICE — GLV SURG SENSICARE PI LF PF 8 GRN STRL

## (undated) DEVICE — VACURETTE CRV RIGD 7MM DISP EA/10

## (undated) DEVICE — KT SURG TURNOVER 050

## (undated) DEVICE — TRAP TISS

## (undated) DEVICE — SOLUTION,WATER,IRRIGATION,1000ML,STERILE: Brand: MEDLINE

## (undated) DEVICE — CATHETER,FOLEY,100%SILICONE,16FR,30ML,LF: Brand: MEDLINE

## (undated) DEVICE — SYR LUERLOK 30CC

## (undated) DEVICE — HOSE BT TO BT VAC CURETTAGE SNGL PT USE EA/10

## (undated) DEVICE — Device

## (undated) DEVICE — CATHETER,URETHRAL,REDRUBBER,STRL,16FR: Brand: MEDLINE